# Patient Record
Sex: MALE | Race: WHITE | NOT HISPANIC OR LATINO | Employment: OTHER | URBAN - METROPOLITAN AREA
[De-identification: names, ages, dates, MRNs, and addresses within clinical notes are randomized per-mention and may not be internally consistent; named-entity substitution may affect disease eponyms.]

---

## 2019-11-08 ENCOUNTER — APPOINTMENT (OUTPATIENT)
Dept: MRI IMAGING | Facility: HOSPITAL | Age: 49
End: 2019-11-08

## 2019-11-08 ENCOUNTER — HOSPITAL ENCOUNTER (OUTPATIENT)
Facility: HOSPITAL | Age: 49
Setting detail: OBSERVATION
Discharge: HOME | End: 2019-11-10
Attending: EMERGENCY MEDICINE | Admitting: FAMILY MEDICINE

## 2019-11-08 ENCOUNTER — APPOINTMENT (OUTPATIENT)
Dept: GENERAL RADIOLOGY | Facility: HOSPITAL | Age: 49
End: 2019-11-08

## 2019-11-08 DIAGNOSIS — G35 MULTIPLE SCLEROSIS EXACERBATION (HCC): Primary | ICD-10-CM

## 2019-11-08 PROBLEM — Z72.0 TOBACCO ABUSE: Status: ACTIVE | Noted: 2019-11-08

## 2019-11-08 PROBLEM — R82.71 BACTERIURIA: Status: ACTIVE | Noted: 2019-11-08

## 2019-11-08 PROBLEM — F12.90 MARIJUANA USE: Status: ACTIVE | Noted: 2019-11-08

## 2019-11-08 LAB
ALBUMIN SERPL-MCNC: 4.1 G/DL (ref 3.5–5.2)
ALBUMIN/GLOB SERPL: 1.2 G/DL
ALP SERPL-CCNC: 50 U/L (ref 39–117)
ALT SERPL W P-5'-P-CCNC: 10 U/L (ref 1–41)
AMPHET+METHAMPHET UR QL: NEGATIVE
AMPHETAMINES UR QL: NEGATIVE
ANION GAP SERPL CALCULATED.3IONS-SCNC: 14 MMOL/L (ref 5–15)
AST SERPL-CCNC: 10 U/L (ref 1–40)
BACTERIA UR QL AUTO: ABNORMAL /HPF
BARBITURATES UR QL SCN: NEGATIVE
BASOPHILS # BLD AUTO: 0.03 10*3/MM3 (ref 0–0.2)
BASOPHILS NFR BLD AUTO: 0.3 % (ref 0–1.5)
BENZODIAZ UR QL SCN: POSITIVE
BILIRUB SERPL-MCNC: 0.6 MG/DL (ref 0.2–1.2)
BILIRUB UR QL STRIP: ABNORMAL
BUN BLD-MCNC: 10 MG/DL (ref 6–20)
BUN/CREAT SERPL: 9.2 (ref 7–25)
BUPRENORPHINE SERPL-MCNC: NEGATIVE NG/ML
CALCIUM SPEC-SCNC: 9.5 MG/DL (ref 8.6–10.5)
CANNABINOIDS SERPL QL: POSITIVE
CHLORIDE SERPL-SCNC: 100 MMOL/L (ref 98–107)
CLARITY UR: CLEAR
CO2 SERPL-SCNC: 25 MMOL/L (ref 22–29)
COCAINE UR QL: NEGATIVE
COLOR UR: ABNORMAL
CREAT BLD-MCNC: 1.09 MG/DL (ref 0.76–1.27)
DEPRECATED RDW RBC AUTO: 41.1 FL (ref 37–54)
EOSINOPHIL # BLD AUTO: 0.05 10*3/MM3 (ref 0–0.4)
EOSINOPHIL NFR BLD AUTO: 0.5 % (ref 0.3–6.2)
ERYTHROCYTE [DISTWIDTH] IN BLOOD BY AUTOMATED COUNT: 12.4 % (ref 12.3–15.4)
ETHANOL BLD-MCNC: <10 MG/DL (ref 0–10)
GFR SERPL CREATININE-BSD FRML MDRD: 71 ML/MIN/1.73
GLOBULIN UR ELPH-MCNC: 3.3 GM/DL
GLUCOSE BLD-MCNC: 143 MG/DL (ref 65–99)
GLUCOSE UR STRIP-MCNC: NEGATIVE MG/DL
HCT VFR BLD AUTO: 50.6 % (ref 37.5–51)
HGB BLD-MCNC: 16.4 G/DL (ref 13–17.7)
HGB UR QL STRIP.AUTO: NEGATIVE
HOLD SPECIMEN: NORMAL
HOLD SPECIMEN: NORMAL
HYALINE CASTS UR QL AUTO: ABNORMAL /LPF
IMM GRANULOCYTES # BLD AUTO: 0.02 10*3/MM3 (ref 0–0.05)
IMM GRANULOCYTES NFR BLD AUTO: 0.2 % (ref 0–0.5)
KETONES UR QL STRIP: ABNORMAL
LEUKOCYTE ESTERASE UR QL STRIP.AUTO: ABNORMAL
LYMPHOCYTES # BLD AUTO: 2.2 10*3/MM3 (ref 0.7–3.1)
LYMPHOCYTES NFR BLD AUTO: 24 % (ref 19.6–45.3)
MAGNESIUM SERPL-MCNC: 1.9 MG/DL (ref 1.6–2.6)
MCH RBC QN AUTO: 29.1 PG (ref 26.6–33)
MCHC RBC AUTO-ENTMCNC: 32.4 G/DL (ref 31.5–35.7)
MCV RBC AUTO: 89.7 FL (ref 79–97)
METHADONE UR QL SCN: NEGATIVE
MONOCYTES # BLD AUTO: 0.48 10*3/MM3 (ref 0.1–0.9)
MONOCYTES NFR BLD AUTO: 5.2 % (ref 5–12)
NEUTROPHILS # BLD AUTO: 6.38 10*3/MM3 (ref 1.7–7)
NEUTROPHILS NFR BLD AUTO: 69.8 % (ref 42.7–76)
NITRITE UR QL STRIP: NEGATIVE
NRBC BLD AUTO-RTO: 0 /100 WBC (ref 0–0.2)
OPIATES UR QL: POSITIVE
OXYCODONE UR QL SCN: NEGATIVE
PCP UR QL SCN: NEGATIVE
PH UR STRIP.AUTO: 5.5 [PH] (ref 5–8)
PLATELET # BLD AUTO: 267 10*3/MM3 (ref 140–450)
PMV BLD AUTO: 11.7 FL (ref 6–12)
POTASSIUM BLD-SCNC: 3.9 MMOL/L (ref 3.5–5.2)
PROPOXYPH UR QL: NEGATIVE
PROT SERPL-MCNC: 7.4 G/DL (ref 6–8.5)
PROT UR QL STRIP: ABNORMAL
RBC # BLD AUTO: 5.64 10*6/MM3 (ref 4.14–5.8)
RBC # UR: ABNORMAL /HPF
REF LAB TEST METHOD: ABNORMAL
SODIUM BLD-SCNC: 139 MMOL/L (ref 136–145)
SP GR UR STRIP: 1.03 (ref 1–1.03)
SQUAMOUS #/AREA URNS HPF: ABNORMAL /HPF
T4 FREE SERPL-MCNC: 1.58 NG/DL (ref 0.93–1.7)
TRICYCLICS UR QL SCN: NEGATIVE
TROPONIN T SERPL-MCNC: <0.01 NG/ML (ref 0–0.03)
TSH SERPL DL<=0.05 MIU/L-ACNC: 2.03 UIU/ML (ref 0.27–4.2)
UROBILINOGEN UR QL STRIP: ABNORMAL
VIT B12 BLD-MCNC: 1072 PG/ML (ref 211–946)
WBC NRBC COR # BLD: 9.16 10*3/MM3 (ref 3.4–10.8)
WBC UR QL AUTO: ABNORMAL /HPF
WHOLE BLOOD HOLD SPECIMEN: NORMAL
WHOLE BLOOD HOLD SPECIMEN: NORMAL

## 2019-11-08 PROCEDURE — 84484 ASSAY OF TROPONIN QUANT: CPT

## 2019-11-08 PROCEDURE — 93005 ELECTROCARDIOGRAM TRACING: CPT

## 2019-11-08 PROCEDURE — 99220 PR INITIAL OBSERVATION CARE/DAY 70 MINUTES: CPT | Performed by: INTERNAL MEDICINE

## 2019-11-08 PROCEDURE — 81001 URINALYSIS AUTO W/SCOPE: CPT | Performed by: EMERGENCY MEDICINE

## 2019-11-08 PROCEDURE — 82607 VITAMIN B-12: CPT | Performed by: EMERGENCY MEDICINE

## 2019-11-08 PROCEDURE — 84443 ASSAY THYROID STIM HORMONE: CPT | Performed by: EMERGENCY MEDICINE

## 2019-11-08 PROCEDURE — 84439 ASSAY OF FREE THYROXINE: CPT | Performed by: EMERGENCY MEDICINE

## 2019-11-08 PROCEDURE — 71045 X-RAY EXAM CHEST 1 VIEW: CPT

## 2019-11-08 PROCEDURE — 85025 COMPLETE CBC W/AUTO DIFF WBC: CPT

## 2019-11-08 PROCEDURE — G0378 HOSPITAL OBSERVATION PER HR: HCPCS

## 2019-11-08 PROCEDURE — 83735 ASSAY OF MAGNESIUM: CPT

## 2019-11-08 PROCEDURE — 96365 THER/PROPH/DIAG IV INF INIT: CPT

## 2019-11-08 PROCEDURE — 70551 MRI BRAIN STEM W/O DYE: CPT

## 2019-11-08 PROCEDURE — 80307 DRUG TEST PRSMV CHEM ANLYZR: CPT | Performed by: EMERGENCY MEDICINE

## 2019-11-08 PROCEDURE — 25010000003 METHYLPREDNISOLONE PER 125 MG: Performed by: EMERGENCY MEDICINE

## 2019-11-08 PROCEDURE — 93005 ELECTROCARDIOGRAM TRACING: CPT | Performed by: EMERGENCY MEDICINE

## 2019-11-08 PROCEDURE — 70552 MRI BRAIN STEM W/DYE: CPT

## 2019-11-08 PROCEDURE — 80053 COMPREHEN METABOLIC PANEL: CPT

## 2019-11-08 PROCEDURE — 99284 EMERGENCY DEPT VISIT MOD MDM: CPT

## 2019-11-08 RX ORDER — SODIUM CHLORIDE 0.9 % (FLUSH) 0.9 %
10 SYRINGE (ML) INJECTION EVERY 12 HOURS SCHEDULED
Status: DISCONTINUED | OUTPATIENT
Start: 2019-11-08 | End: 2019-11-10 | Stop reason: HOSPADM

## 2019-11-08 RX ORDER — LIDOCAINE HYDROCHLORIDE 20 MG/ML
15 SOLUTION OROPHARYNGEAL ONCE
Status: COMPLETED | OUTPATIENT
Start: 2019-11-08 | End: 2019-11-08

## 2019-11-08 RX ORDER — SODIUM CHLORIDE 0.9 % (FLUSH) 0.9 %
10 SYRINGE (ML) INJECTION AS NEEDED
Status: DISCONTINUED | OUTPATIENT
Start: 2019-11-08 | End: 2019-11-10 | Stop reason: HOSPADM

## 2019-11-08 RX ORDER — ALUMINA, MAGNESIA, AND SIMETHICONE 2400; 2400; 240 MG/30ML; MG/30ML; MG/30ML
15 SUSPENSION ORAL ONCE
Status: COMPLETED | OUTPATIENT
Start: 2019-11-08 | End: 2019-11-08

## 2019-11-08 RX ORDER — ACETAMINOPHEN 160 MG/5ML
650 SOLUTION ORAL EVERY 4 HOURS PRN
Status: DISCONTINUED | OUTPATIENT
Start: 2019-11-08 | End: 2019-11-10 | Stop reason: HOSPADM

## 2019-11-08 RX ORDER — CALCIUM CARBONATE 750 MG/1
1500 TABLET, CHEWABLE ORAL 3 TIMES DAILY PRN
Status: DISCONTINUED | OUTPATIENT
Start: 2019-11-08 | End: 2019-11-10 | Stop reason: HOSPADM

## 2019-11-08 RX ORDER — ACETAMINOPHEN 650 MG/1
650 SUPPOSITORY RECTAL EVERY 4 HOURS PRN
Status: DISCONTINUED | OUTPATIENT
Start: 2019-11-08 | End: 2019-11-10 | Stop reason: HOSPADM

## 2019-11-08 RX ORDER — ACETAMINOPHEN 325 MG/1
650 TABLET ORAL EVERY 4 HOURS PRN
Status: DISCONTINUED | OUTPATIENT
Start: 2019-11-08 | End: 2019-11-10 | Stop reason: HOSPADM

## 2019-11-08 RX ADMIN — ALUMINUM HYDROXIDE, MAGNESIUM HYDROXIDE, AND DIMETHICONE 15 ML: 400; 400; 40 SUSPENSION ORAL at 17:17

## 2019-11-08 RX ADMIN — METHYLPREDNISOLONE SODIUM SUCCINATE 1 G: 1 INJECTION, POWDER, FOR SOLUTION INTRAMUSCULAR; INTRAVENOUS at 20:15

## 2019-11-08 RX ADMIN — LIDOCAINE HYDROCHLORIDE 15 ML: 20 SOLUTION ORAL; TOPICAL at 17:17

## 2019-11-08 NOTE — ED PROVIDER NOTES
"Mavis Williamson is a 49 y.o. male who presents to the ED with c/o dizziness. The patient has had dizziness for the past month that is characteristic of off-balance and \"wobbly.\" He has difficulty ambulating as he veers from side to side and must widen his steps to compensate for this imbalance. His significant other states he has had increased hearing loss over the past month with the left sided being worse. The patient complains of intermittent pain behind his right eye and occasional slurred speech but denies visual disturbances. His significant other reports the patient has been wearing new eye glasses for the past month. He is not taking any daily medications and he has not seen a physician in a while. The patient has a family history of vertigo but he has no past diagnosis of the condition. He denies consumption of alcohol. There are no other acute complaints at this time.        History provided by:  Patient and significant other  Dizziness   Quality:  Head spinning and imbalance  Severity:  Moderate  Onset quality:  Gradual  Duration:  1 month  Timing:  Constant  Progression:  Worsening  Chronicity:  New  Relieved by:  Being still  Worsened by:  Eye movement and turning head  Ineffective treatments:  Closing eyes  Associated symptoms: hearing loss    Associated symptoms: no blood in stool, no chest pain, no nausea, no syncope, no vomiting and no weakness    Risk factors: no anemia, no heart disease, no hx of stroke, no hx of vertigo, no Meniere's disease, no multiple medications and no new medications        Review of Systems   HENT: Positive for hearing loss.    Eyes: Positive for pain (intermittent behind right eye). Negative for visual disturbance.   Cardiovascular: Negative for chest pain and syncope.   Gastrointestinal: Negative for blood in stool, nausea and vomiting.   Neurological: Positive for dizziness and speech difficulty (occasional slurred speech). Negative for syncope, facial " asymmetry and weakness.        Patient complains of difficulty ambulating, cannot walk in a straight line.   All other systems reviewed and are negative.      History reviewed. No pertinent past medical history.    No Known Allergies    History reviewed. No pertinent surgical history.    Family History   Problem Relation Age of Onset   • No Known Problems Mother        Social History     Socioeconomic History   • Marital status:      Spouse name: Not on file   • Number of children: Not on file   • Years of education: Not on file   • Highest education level: Not on file   Tobacco Use   • Smoking status: Current Some Day Smoker     Types: Cigarettes   • Smokeless tobacco: Never Used   Substance and Sexual Activity   • Alcohol use: Yes     Comment: rare   • Drug use: Yes     Types: Marijuana     Comment: socially    • Sexual activity: Defer         Objective   Physical Exam   Constitutional: He is oriented to person, place, and time. He appears well-developed and well-nourished. No distress.   HENT:   Head: Normocephalic and atraumatic.   Eyes: Conjunctivae are normal. No scleral icterus.   Neck: Normal range of motion. Neck supple.   Cardiovascular: Normal rate, regular rhythm and normal heart sounds.   No murmur heard.  Pulmonary/Chest: Effort normal and breath sounds normal. No respiratory distress.   Abdominal: Soft. There is no tenderness. There is no rebound and no guarding.   Musculoskeletal: Normal range of motion. He exhibits no edema.   Neurological: He is alert and oriented to person, place, and time. He has normal strength. He displays no tremor. No cranial nerve deficit. He exhibits normal muscle tone. He displays no seizure activity. Coordination normal.   Finger-nose normal.  No nystagmus.   Skin: Skin is warm and dry.   Psychiatric: He has a normal mood and affect. His behavior is normal.   Nursing note and vitals reviewed.      Procedures         ED Course     Intact neuro exam.  MRI shows  lesions suggestive of MS.  UDS multiple positive.  Discussed with Dr Zeng who recommended admission, high dose steroids, contrasted MRI and further workup.  Patient stable on serial rechecks.  Discussed exam findings, test results so far and concerns in detail at the bedside.  Discussed need for admission for further evaluation and treatment.                  MDM  Number of Diagnoses or Management Options  Multiple sclerosis exacerbation (CMS/HCC):      Amount and/or Complexity of Data Reviewed  Clinical lab tests: ordered and reviewed  Tests in the radiology section of CPT®: ordered and reviewed  Decide to obtain previous medical records or to obtain history from someone other than the patient: yes  Obtain history from someone other than the patient: yes  Discuss the patient with other providers: yes  Independent visualization of images, tracings, or specimens: yes        Final diagnoses:   Multiple sclerosis exacerbation (CMS/HCC)       Documentation assistance provided by olvin Lombardo.  Information recorded by the olvin was done at my direction and has been verified and validated by me.     Errol Lombardo  11/08/19 1606       Errol Lombardo  11/08/19 1809       Errol Lombardo  11/08/19 1804       Alden Montgomery MD  11/08/19 0676

## 2019-11-09 ENCOUNTER — APPOINTMENT (OUTPATIENT)
Dept: CT IMAGING | Facility: HOSPITAL | Age: 49
End: 2019-11-09

## 2019-11-09 LAB
ANION GAP SERPL CALCULATED.3IONS-SCNC: 13 MMOL/L (ref 5–15)
BASOPHILS # BLD MANUAL: 0 10*3/MM3 (ref 0–0.2)
BASOPHILS NFR BLD AUTO: 0 % (ref 0–1.5)
BUN BLD-MCNC: 10 MG/DL (ref 6–20)
BUN/CREAT SERPL: 9.8 (ref 7–25)
CALCIUM SPEC-SCNC: 9.6 MG/DL (ref 8.6–10.5)
CHLORIDE SERPL-SCNC: 98 MMOL/L (ref 98–107)
CO2 SERPL-SCNC: 26 MMOL/L (ref 22–29)
CREAT BLD-MCNC: 1.02 MG/DL (ref 0.76–1.27)
CRP SERPL-MCNC: 0.06 MG/DL (ref 0–0.5)
DEPRECATED RDW RBC AUTO: 41.3 FL (ref 37–54)
EOSINOPHIL # BLD MANUAL: 0 10*3/MM3 (ref 0–0.4)
EOSINOPHIL NFR BLD MANUAL: 0 % (ref 0.3–6.2)
ERYTHROCYTE [DISTWIDTH] IN BLOOD BY AUTOMATED COUNT: 12.4 % (ref 12.3–15.4)
ERYTHROCYTE [SEDIMENTATION RATE] IN BLOOD: 28 MM/HR (ref 0–20)
GFR SERPL CREATININE-BSD FRML MDRD: 77 ML/MIN/1.73
GLUCOSE BLD-MCNC: 151 MG/DL (ref 65–99)
HBA1C MFR BLD: 5.5 % (ref 4.8–5.6)
HCT VFR BLD AUTO: 52.8 % (ref 37.5–51)
HGB BLD-MCNC: 16.9 G/DL (ref 13–17.7)
LARGE PLATELETS: ABNORMAL
LYMPHOCYTES # BLD MANUAL: 0.76 10*3/MM3 (ref 0.7–3.1)
LYMPHOCYTES NFR BLD MANUAL: 1 % (ref 5–12)
LYMPHOCYTES NFR BLD MANUAL: 10 % (ref 19.6–45.3)
MCH RBC QN AUTO: 28.9 PG (ref 26.6–33)
MCHC RBC AUTO-ENTMCNC: 32 G/DL (ref 31.5–35.7)
MCV RBC AUTO: 90.3 FL (ref 79–97)
MONOCYTES # BLD AUTO: 0.08 10*3/MM3 (ref 0.1–0.9)
NEUTROPHILS # BLD AUTO: 6.48 10*3/MM3 (ref 1.7–7)
NEUTROPHILS NFR BLD MANUAL: 85 % (ref 42.7–76)
PLATELET # BLD AUTO: 296 10*3/MM3 (ref 140–450)
PMV BLD AUTO: 12.5 FL (ref 6–12)
POTASSIUM BLD-SCNC: 4.8 MMOL/L (ref 3.5–5.2)
RBC # BLD AUTO: 5.85 10*6/MM3 (ref 4.14–5.8)
RBC MORPH BLD: NORMAL
SMUDGE CELLS BLD QL SMEAR: ABNORMAL
SODIUM BLD-SCNC: 137 MMOL/L (ref 136–145)
VARIANT LYMPHS NFR BLD MANUAL: 4 % (ref 0–5)
WBC NRBC COR # BLD: 7.62 10*3/MM3 (ref 3.4–10.8)

## 2019-11-09 PROCEDURE — 85025 COMPLETE CBC W/AUTO DIFF WBC: CPT | Performed by: NURSE PRACTITIONER

## 2019-11-09 PROCEDURE — 0 IOPAMIDOL PER 1 ML: Performed by: FAMILY MEDICINE

## 2019-11-09 PROCEDURE — 97162 PT EVAL MOD COMPLEX 30 MIN: CPT

## 2019-11-09 PROCEDURE — 71270 CT THORAX DX C-/C+: CPT

## 2019-11-09 PROCEDURE — G0378 HOSPITAL OBSERVATION PER HR: HCPCS

## 2019-11-09 PROCEDURE — 99225 PR SBSQ OBSERVATION CARE/DAY 25 MINUTES: CPT | Performed by: FAMILY MEDICINE

## 2019-11-09 PROCEDURE — 85652 RBC SED RATE AUTOMATED: CPT | Performed by: NURSE PRACTITIONER

## 2019-11-09 PROCEDURE — 0 GADOBENATE DIMEGLUMINE 529 MG/ML SOLUTION: Performed by: INTERNAL MEDICINE

## 2019-11-09 PROCEDURE — 74178 CT ABD&PLV WO CNTR FLWD CNTR: CPT

## 2019-11-09 PROCEDURE — 86140 C-REACTIVE PROTEIN: CPT | Performed by: NURSE PRACTITIONER

## 2019-11-09 PROCEDURE — 83036 HEMOGLOBIN GLYCOSYLATED A1C: CPT | Performed by: INTERNAL MEDICINE

## 2019-11-09 PROCEDURE — A9577 INJ MULTIHANCE: HCPCS | Performed by: INTERNAL MEDICINE

## 2019-11-09 PROCEDURE — 80048 BASIC METABOLIC PNL TOTAL CA: CPT | Performed by: NURSE PRACTITIONER

## 2019-11-09 RX ORDER — FAMOTIDINE 20 MG/1
20 TABLET, FILM COATED ORAL
Status: DISCONTINUED | OUTPATIENT
Start: 2019-11-09 | End: 2019-11-10 | Stop reason: HOSPADM

## 2019-11-09 RX ORDER — IBUPROFEN 600 MG/1
600 TABLET ORAL EVERY 6 HOURS PRN
Status: DISCONTINUED | OUTPATIENT
Start: 2019-11-09 | End: 2019-11-10 | Stop reason: HOSPADM

## 2019-11-09 RX ORDER — HYDROXYZINE HYDROCHLORIDE 25 MG/1
25 TABLET, FILM COATED ORAL 3 TIMES DAILY PRN
Status: DISCONTINUED | OUTPATIENT
Start: 2019-11-09 | End: 2019-11-10 | Stop reason: HOSPADM

## 2019-11-09 RX ADMIN — SODIUM CHLORIDE, PRESERVATIVE FREE 10 ML: 5 INJECTION INTRAVENOUS at 08:47

## 2019-11-09 RX ADMIN — Medication 400 MG: at 12:15

## 2019-11-09 RX ADMIN — METOPROLOL TARTRATE 12.5 MG: 25 TABLET, FILM COATED ORAL at 19:28

## 2019-11-09 RX ADMIN — Medication 1500 MG: at 08:47

## 2019-11-09 RX ADMIN — FAMOTIDINE 20 MG: 20 TABLET, FILM COATED ORAL at 11:28

## 2019-11-09 RX ADMIN — HYDROXYZINE HYDROCHLORIDE 25 MG: 25 TABLET, FILM COATED ORAL at 22:44

## 2019-11-09 RX ADMIN — GADOBENATE DIMEGLUMINE 19 ML: 529 INJECTION, SOLUTION INTRAVENOUS at 00:45

## 2019-11-09 RX ADMIN — IBUPROFEN 600 MG: 600 TABLET, FILM COATED ORAL at 12:15

## 2019-11-09 RX ADMIN — IOPAMIDOL 100 ML: 755 INJECTION, SOLUTION INTRAVENOUS at 15:51

## 2019-11-09 RX ADMIN — FAMOTIDINE 20 MG: 20 TABLET, FILM COATED ORAL at 17:15

## 2019-11-09 NOTE — PROGRESS NOTES
"    [facility]  PROGRESS NOTE    Patient Name: Man Williamson  : 1970  MRN: [id number]    Date of Admission: 2019  Primary Care Physician: Provider, No Known    Subjective   Subjective     CC:  Dizziness    HPI:  Patient is a 49-year-old who was admitted after presenting to the emergency department complaining of several day history of dizziness.  He also reports some headaches.  His MRI was positive for contrast-enhancing white matter lesions concerning for demyelinating disease.  It is thought that he may have multiple sclerosis.  Neurology consult has been requested.  Patient denies other complaints or symptoms.  He states he has not been falling down or had changes in his vision.  We discussed proceeding with a work-up including a lumbar puncture, at this time he is declining to have a lumbar puncture stating he does not want to risk being paralyzed.  I reassured him that risk of complication although present is very low with this procedure and I encouraged him to discuss this with neurology.  He is tolerating p.o.  He denies fever.  He appears to be unaccepting of the potential that he could have MS.  He states \"for 50 or 100 years no one in my whole family has had MS.\"    Review of Systems  General: No fever, chills, fatigue  ENT: No sore throat, trouble swallowing or changes in vision  Respiratory: No shortness of breath, cough, wheezing or fast breathing  Cardiovascular: No chest pain, palpitations, dyspnea with exertion  Gastrointestinal: No nausea vomiting abdominal pain  Musculoskeletal: No difficulty walking, no weakness  Vascular: No cyanosis or clubbing  Lymphatic: No peripheral edema, no lymphadenopathy  Neurologic: Positive headache, denies confusion, positive dizziness  Psychiatric: No changes in mood.  No depression or anxiety.     Objective   Objective     Vital Signs:   Temp:  [97.5 °F (36.4 °C)-99.5 °F (37.5 °C)] 97.7 °F (36.5 °C)  Heart Rate: "  [] 109  Resp:  [16-18] 18  BP: (112-131)/(68-90) 122/70        Physical Exam:  Constitutional: No acute distress, awake, alert, nontoxic, normal body habitus  Eyes: Pupils equal, sclerae anicteric, no conjunctival injection  HENT: NCAT, mucous membranes moist  Neck: Supple, no thyromegaly, no lymphadenopathy  Respiratory: Clear to auscultation bilaterally, good effort, nonlabored respirations   Cardiovascular: RRR  Gastrointestinal: Positive bowel sounds, soft, nontender, nondistended  Musculoskeletal: No peripheral edema, normal muscle tone for age  Psychiatric: At times tearful, cooperative  Neurologic: Oriented x 3, movements symmetric BUE and BLE, Cranial Nerves grossly intact to confrontation, speech clear and fluent  Skin: No rashes, no jaundice, no petechiae, no mottling  Results Reviewed:    Results from last 7 days   Lab Units 11/08/19  1347   WBC 10*3/mm3 9.16   HEMOGLOBIN g/dL 16.4   HEMATOCRIT % 50.6   PLATELETS 10*3/mm3 267     Results from last 7 days   Lab Units 11/09/19  0631 11/08/19  1346   SODIUM mmol/L 137 139   POTASSIUM mmol/L 4.8 3.9   CHLORIDE mmol/L 98 100   CO2 mmol/L 26.0 25.0   BUN mg/dL 10 10   CREATININE mg/dL 1.02 1.09   GLUCOSE mg/dL 151* 143*   CALCIUM mg/dL 9.6 9.5   ALT (SGPT) U/L  --  10   AST (SGOT) U/L  --  10   TROPONIN T ng/mL  --  <0.010     Estimated Creatinine Clearance: 105.3 mL/min (by C-G formula based on SCr of 1.02 mg/dL).    Microbiology Results Abnormal     None          Imaging Results (Last 24 Hours)     Procedure Component Value Units Date/Time    MRI Brain With Contrast [[id number]] Collected:  11/09/19 0751     Updated:  11/09/19 0754    Narrative:       EXAMINATION: MRI BRAIN W CONTRAST-     INDICATION: dizziness, ataxia, ? MS; G35-Multiple sclerosis pain behind  the right eye, dizziness, ataxia     TECHNIQUE: Routine multiple imaging is obtained of the brain following  the ministration gadolinium contrast.     COMPARISON: NONE     FINDINGS: There are  multiple too numerous to count contrast enhancing  lesions scattered throughout the parenchyma. Findings most concerning  for metastatic process. There is less likelyhood of a active  demyelinating white matter process given the extensive areas of  enhancement and involvement. There is minimal surrounding edema.  Continued follow-up is recommended as indicated with evaluation for  primary source recommended.          Impression:       Multiple too numerous to count areas of contrast enhancement  correlating to the areas of increased signal on the noncontrasted  examination. Findings concerning for metastatic process given the extent  of enhancement. Findings less favor matter process due to the extensive  enhancement pattern.           MRI Brain Without Contrast [[id number]] Collected:  11/08/19 1628     Updated:  11/08/19 1653    Narrative:       EXAMINATION: MRI BRAIN WO CONTRAST-     INDICATION: ataxia, L ear hearing loss, R frontal headaches for 1 month  right I pain and loss of sight     TECHNIQUE: Routine multiple imaging is obtained of the brain without the  ministration gadolinium contrast.     COMPARISON: NONE     FINDINGS: There is abnormal signal diffusion-weighted imaging within the  right parietal lobe suggesting possible active MS plaque. There is no  evidence of restricted diffusion at this time to suggest evidence of  tumefactive MS. There is areas of abnormal signal intensity seen  scattered throughout the peritoneal or and subcortical white matter  suggesting either chronic small vessel ischemic change or possibly  developing white matter process. There is some central low signal seen  within several of the lesions within the right periventricular white  matter concerning for possible to developing white matter process such  as multiple sclerosis. Contrast enhancement imaging is recommended for  further evaluation to exclude possible active demyelinating white matter  disease. There is abnormal  signal is well seen within the cerebellum.  Abnormal signal seen within the brainstem. Visualized paranasal sinuses  are clear. The mastoid air cells are patent. Pituitary and sellar  unremarkable. Craniovertebral junction is preserved.          Impression:       Scattered areas of abnormal signal intensity seen throughout  the periventricular and subcortical white matter as well as within the  cerebellum and brainstem. There is some edema identified as well as  abnormal signal on diffusion imaging concerning for possible active  demyelinating white matter disease.. Enhancement is recommended for  further evaluation of active plaque in lesions.. There is no evidence of  restricted diffusion at this time to suggest evidence of tumefactive MS.  Continued follow-up is recommended.           XR Chest 1 View [[id number]] Collected:  11/08/19 1501     Updated:  11/08/19 1502    Narrative:          EXAMINATION: XR CHEST 1 VW-      INDICATION: Weak/Dizzy/AMS triage protocol      COMPARISON: NONE     FINDINGS: Portable chest reveals cardiac and mediastinal silhouettes  within normal limits. The lung fields are grossly clear. No focal right  opacification present. A pleural effusion or pneumothorax. Bony  structures are unremarkable. Pulmonary vascularity is within normal  limits.           Impression:       No acute cardiopulmonary disease                     I have reviewed the medications:  Scheduled Meds:  enoxaparin 40 mg Subcutaneous Q24H   sodium chloride 10 mL Intravenous Q12H     Continuous Infusions:   PRN Meds:.•  acetaminophen **OR** acetaminophen **OR** acetaminophen  •  calcium carbonate EX  •  sodium chloride  •  sodium chloride      Assessment/Plan   Assessment / Plan     Active Hospital Problems    Diagnosis  POA   • **Multiple sclerosis exacerbation (CMS/HCC) [G35]  Yes   • Marijuana use [F12.90]  Yes   • Tobacco abuse [Z72.0]  Yes   • Bacteriuria [R82.71]  Yes      Resolved Hospital Problems   No resolved  problems to display.        Brief Hospital Course to date:  Man Williamson is a 49 y.o. male who presented to the emergency department with complaints of dizziness and headache.  He was found to have contrast-enhancing brain lesions on MRI concerning for demyelinating disease such as multiple sclerosis.    Demyelinating lesions of the brain concerning for multiple sclerosis  -Neurology consult pending  -At the present patient is declining lumbar puncture  -Continue steroids    Headache  -Tylenol PRN    Dizziness  -Likely secondary to demyelinating process    Bacteriuria  -Asymptomatic, holding antibiotics     Gastroesophageal reflux disease   -Tums and Pepcid as needed    Tobacco abuse    Marijuana use      DVT Prophylaxis: Mechanical secondary to potential need for lumbar puncture      CODE STATUS:   Code Status and Medical Interventions:   Ordered at: 11/08/19 2046     Level Of Support Discussed With:    Patient     Code Status:    CPR     Medical Interventions (Level of Support Prior to Arrest):    Full         Electronically signed by Mirna Ruelas MD, 11/09/19, 9:21 AM.

## 2019-11-09 NOTE — NURSING NOTE
"Pt did ambulate with physical therapy today, but denies wanting to continue despite reported weakness and ataxia as noted from the therapist. CT of abd/pelvis completed. Pt still doesn't want lumbar puncture, but is focused on \"getting out of here.\" Vital signs stable. Only reports having chronic back pain and \"twinges\" behind right eye.  "

## 2019-11-09 NOTE — PLAN OF CARE
Problem: Patient Care Overview  Goal: Plan of Care Review  Outcome: Ongoing (interventions implemented as appropriate)    Goal: Discharge Needs Assessment  Outcome: Ongoing (interventions implemented as appropriate)    Goal: Interprofessional Rounds/Family Conf  Outcome: Ongoing (interventions implemented as appropriate)      Problem: Pain, Chronic (Adult)  Goal: Identify Related Risk Factors and Signs and Symptoms  Outcome: Ongoing (interventions implemented as appropriate)    Goal: Acceptable Pain/Comfort Level and Functional Ability  Outcome: Ongoing (interventions implemented as appropriate)      Problem: Fall Risk (Adult)  Goal: Identify Related Risk Factors and Signs and Symptoms  Outcome: Ongoing (interventions implemented as appropriate)    Goal: Absence of Fall  Outcome: Outcome(s) achieved Date Met: 11/09/19

## 2019-11-09 NOTE — PROGRESS NOTES
"Adult Nutrition  Assessment/PES    Patient Name:  Man Williamson  YOB: 1970  MRN: [id number]  Admit Date:  11/8/2019    Assessment Date:  11/9/2019    Comments:      Reason for Assessment     Row Name 11/09/19 1121          Reason for Assessment    Reason For Assessment  identified at risk by screening criteria 30\". PT REPORTS HIS APPETITE & INTAKE OF FOOD MAY HAVE BEEN DOWN RECENTLY BUT NOT < 75% OF USUAL. HE REPORTS MAY HAVE LOST WEIGHT BUT HIS WEIGHT FLUCTUATES NORMALLY. HE DID REPORTS SPECIFIC FOOD PREFS AS HE IS \"VERY PICKY\" WITH FOOD. THESE PREFS TAKEN &      Diagnosis  -- WILL BE SENT TO FOOD & NUTRITION SERVICES. PT DOES NOT MEET CRITERIA FOR FURTHER SCREENING & WILL SEE PER PROTOCOL.     Identified At Risk by Screening Criteria  unintentional loss of 10 lbs or more in the past 2 mos;MST SCORE 2+;reduced oral intake over the last month                             Problem/Interventions:                    Electronically signed by:  Vielka Jernigan RD  11/09/19 11:24 AM  "

## 2019-11-09 NOTE — H&P
"[facility] Medicine Services  HISTORY AND PHYSICAL    Patient Name: Man Williamson  : 1970  MRN: [id number]  Primary Care Physician: Provider, No Known    Subjective   Subjective     Chief Complaint:  Dizziness, \"off balance\"     HPI:  Man Williamson is a 49 y.o. male with a past medical history significant for tobacco use and marijuana use presents to the ED with complaints of dizziness.  Patient reports over the past month dizziness(worse at night and in am) with hearing loss (worse on left) along with feeling as if \"something is behind my right eye.\"  Patient reports having to have a \"strong prescription\" for his glasses.  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 In addition he notes difficulty with his gait He denies any nausea, vomiting, diarrhea, abdominal pain,  Chest pain, shortness of air, dysuria, hematuria, tremor or changes in speech.  MRI wo contrast obtained tonight is concerning for scattered areas of abnormal signal intensity seen throughout the periventricular and subcortical white matter as well as within the cerebellum and brainstem.  Some edema is identified as well as abnormal signal on diffusion imagine concerning for possible active demyelinating white matter disease. Patient will be admitted to [facility] under the care of the Hospitalist for further evaluation and treatment.     Review of Systems   Constitutional: Positive for activity change. Negative for appetite change, chills, diaphoresis, fatigue, fever and unexpected weight change.   HENT: Positive for hearing loss.    Eyes: Negative for photophobia.   Respiratory: Negative for cough and shortness of breath.    Cardiovascular: Negative for chest pain, palpitations and leg swelling.   Gastrointestinal: Negative for abdominal distention, abdominal pain, blood in stool, constipation, diarrhea, nausea and " vomiting.   Genitourinary: Negative for difficulty urinating, dysuria, flank pain, frequency and hematuria.   Musculoskeletal: Positive for gait problem. Negative for neck pain and neck stiffness.   Skin: Negative.    Neurological: Positive for dizziness. Negative for seizures, syncope, facial asymmetry, speech difficulty, weakness, light-headedness, numbness and headaches.   Psychiatric/Behavioral: Negative.         Otherwise 10-system ROS reviewed and is negative except as mentioned in the HPI.    Personal History     History reviewed. No pertinent past medical history.    History reviewed. No pertinent surgical history.    Family History: family history includes No Known Problems in his mother.     Social History:  reports that he has been smoking cigarettes.  He has never used smokeless tobacco. He reports that he drinks alcohol. He reports that he uses drugs. Drug: Marijuana.    Medications:  No medications prior to admission.       No Known Allergies    Objective   Objective     Vital Signs:   Temp:  [98.6 °F (37 °C)] 98.6 °F (37 °C)  Heart Rate:  [] 93  Resp:  [16] 16  BP: (115-129)/(78-90) 129/90        Physical Exam   Constitutional: He is oriented to person, place, and time. He appears well-developed and well-nourished. No distress.   Alert and oriented x4, wife at bedside.     HENT:   Head: Normocephalic and atraumatic.   Eyes: Conjunctivae are normal. Pupils are equal, round, and reactive to light. Right eye exhibits no discharge. Left eye exhibits no discharge. No scleral icterus.   Nystagmus noted    Neck: Normal range of motion. Neck supple. No JVD present. No tracheal deviation present. No thyromegaly present.   Cardiovascular: Normal rate, regular rhythm, normal heart sounds and intact distal pulses. Exam reveals no gallop and no friction rub.   No murmur heard.  Pulmonary/Chest: Effort normal and breath sounds normal. No stridor. No respiratory distress. He has no wheezes. He has no rales. He  exhibits no tenderness.   Abdominal: Soft. Bowel sounds are normal. He exhibits no distension and no mass. There is no tenderness. There is no rebound and no guarding. No hernia.   Musculoskeletal: Normal range of motion. He exhibits no edema, tenderness or deformity.   Lymphadenopathy:     He has no cervical adenopathy.   Neurological: He is alert and oriented to person, place, and time.    equal, nystagmus noted, no clonus, sensation intact throughout. Speech clear.    Skin: Skin is warm and dry. No rash noted. He is not diaphoretic. No erythema. No pallor.   Psychiatric: He has a normal mood and affect. His behavior is normal. Judgment and thought content normal.   Nursing note and vitals reviewed.       Results Reviewed:  I have personally reviewed current lab, radiology, and data and agree.    Results from last 7 days   Lab Units 11/08/19  1347   WBC 10*3/mm3 9.16   HEMOGLOBIN g/dL 16.4   HEMATOCRIT % 50.6   PLATELETS 10*3/mm3 267     Results from last 7 days   Lab Units 11/08/19  1346   SODIUM mmol/L 139   POTASSIUM mmol/L 3.9   CHLORIDE mmol/L 100   CO2 mmol/L 25.0   BUN mg/dL 10   CREATININE mg/dL 1.09   GLUCOSE mg/dL 143*   CALCIUM mg/dL 9.5   ALT (SGPT) U/L 10   AST (SGOT) U/L 10     No results found for: BNP  No results found for: PHART, PCO2  Imaging Results (Last 24 Hours)     Procedure Component Value Units Date/Time    MRI Brain Without Contrast [[id number]] Collected:  11/08/19 1628     Updated:  11/08/19 1653    Narrative:       EXAMINATION: MRI BRAIN WO CONTRAST-     INDICATION: ataxia, L ear hearing loss, R frontal headaches for 1 month  right I pain and loss of sight     TECHNIQUE: Routine multiple imaging is obtained of the brain without the  ministration gadolinium contrast.     COMPARISON: NONE     FINDINGS: There is abnormal signal diffusion-weighted imaging within the  right parietal lobe suggesting possible active MS plaque. There is no  evidence of restricted diffusion at this time to  suggest evidence of  tumefactive MS. There is areas of abnormal signal intensity seen  scattered throughout the peritoneal or and subcortical white matter  suggesting either chronic small vessel ischemic change or possibly  developing white matter process. There is some central low signal seen  within several of the lesions within the right periventricular white  matter concerning for possible to developing white matter process such  as multiple sclerosis. Contrast enhancement imaging is recommended for  further evaluation to exclude possible active demyelinating white matter  disease. There is abnormal signal is well seen within the cerebellum.  Abnormal signal seen within the brainstem. Visualized paranasal sinuses  are clear. The mastoid air cells are patent. Pituitary and sellar  unremarkable. Craniovertebral junction is preserved.          Impression:       Scattered areas of abnormal signal intensity seen throughout  the periventricular and subcortical white matter as well as within the  cerebellum and brainstem. There is some edema identified as well as  abnormal signal on diffusion imaging concerning for possible active  demyelinating white matter disease.. Enhancement is recommended for  further evaluation of active plaque in lesions.. There is no evidence of  restricted diffusion at this time to suggest evidence of tumefactive MS.  Continued follow-up is recommended.           XR Chest 1 View [[id number]] Collected:  11/08/19 1501     Updated:  11/08/19 1502    Narrative:          EXAMINATION: XR CHEST 1 VW-      INDICATION: Weak/Dizzy/AMS triage protocol      COMPARISON: NONE     FINDINGS: Portable chest reveals cardiac and mediastinal silhouettes  within normal limits. The lung fields are grossly clear. No focal right  opacification present. A pleural effusion or pneumothorax. Bony  structures are unremarkable. Pulmonary vascularity is within normal  limits.           Impression:       No acute  "cardiopulmonary disease                   Assessment/Plan   Assessment / Plan     Active Hospital Problems    Diagnosis   • **Multiple sclerosis exacerbation (CMS/[facility])   • Marijuana use   • Tobacco abuse   • Bacteriuria         Assessment & Plan:  49 year old male presents to the ED with dizziness, ataxia, vision changes and hearing loss that has worsened over the past month.     1) Multiple sclerosis exacerbation  -new diagnosis  -MRI of brain without contrast as mentioned above  -case was discussed with neurology whom recommends MRI with contrast along with 1 gram of solumedrol.    -consult neurology in am  -fall precautions  -up with assistance     2) Bacteriuria  -UA: 2+ bacteria, negative nitrites, trace leuks  -hold on abx for now, obtain urine cultures    3) Marijuana use  -admits to smoking marijuana \"occasionally\"   -in addition he reports taking a xanax and lortab yesterday due to \"needing sleep\" but reports he does not \"take regularly\"   -counseling provided    4) Tobacco abuse  -reports only uses \"sometimes\"  -Tobacco Cessation Counselin minutes of tobacco cessation counseling provided, including but not limited to, risks of ongoing tobacco use, pertinence to current or future health status and availability/examples of cessation resources.  Patient expresses desire to quit.  -refuses nicotine patch           DVT prophylaxis: scds, lovenox     CODE STATUS: full code    There are no questions and answers to display.       Admission Status:  I believe this patient meets INPATIENT status due to IV stress dose steroids and Neurology evaluation.  I feel patient’s risk for adverse outcomes and need for care warrant INPATIENT evaluation and I predict the patient’s care encounter to likely last beyond 2 midnights.    MIKAYLA Crowell   19   8:18 PM        Brief Attending Admission Attestation     I have seen and examined the patient, performing an independent face-to-face diagnostic evaluation " with plan of care reviewed and developed with the advanced practice clinician (APC).      Brief Summary Statement:   Man Williamson is a 49 y.o. male with history of tobacco abuse presents after one month of dizziness accompanied by periodic numbness, eye pain and hearing loss. MRI brain obtained that showed white matter changes concerning for demyelinating process. Patient started on steroids and admitted to the hospitalist service.    Remainder of detailed HPI is as noted above and has been reviewed and/or edited by me for completeness.      Attending Physical Exam:  Constitutional -no acute distress, non toxic, in bed  HEENT-NCAT, mucous membranes moist  CV-RRR, S1 S2 normal, no m/r/g  Resp-CTAB, no wheezes, rhonchi or rales  Abd-soft, non-tender, non-distended, normo active bowel sounds  Ext-No lower extremity cyanosis, clubbing or edema bilaterally  Neuro-alert, speech clear, moves all extremities, no drift of UE.  Psych-normal affect   Skin- No rash on exposed UE or LE bilaterally      enoxaparin 40 mg Subcutaneous Q24H   sodium chloride 10 mL Intravenous Q12H         Brief Assessment/Plan :    Possible Demyelinating Disease  - MRI brain w contrast this evening personally reviewed, revealing multiple bilateral enhancing lesions to my eye. Mr Williamson has already received 1 gram of solumedrol this evening.  - will continue supportive care, formal Neurology consultation in am  - PT consult am    Tobacco abuse  - nicotine replacement, counseled cessation, CXR with no infiltrate.    Hyperglycemia  - check a1c am      See above for further detailed assessment and plan developed with APC which I have reviewed and/or edited for completeness.      Electronically signed by Jeffrey Matamoros MD, 11/09/19, 12:55 AM.

## 2019-11-09 NOTE — THERAPY DISCHARGE NOTE
Patient Name: Man Williamson  : 1970    MRN: [id number]                              Today's Date: 2019       Admit Date: 2019    Visit Dx:     ICD-10-CM ICD-9-CM   1. Multiple sclerosis exacerbation (CMS/HCC) G35 340     Patient Active Problem List   Diagnosis   • Multiple sclerosis exacerbation (CMS/HCC)   • Marijuana use   • Tobacco abuse   • Bacteriuria     History reviewed. No pertinent past medical history.  History reviewed. No pertinent surgical history.  General Information     Row Name 19 1440          PT Evaluation Time/Intention    Document Type  discharge evaluation/summary  -SR     Mode of Treatment  physical therapy  -SR     Row Name 19 1440          General Information    Patient Profile Reviewed?  yes  -SR     Prior Level of Function  independent:;all household mobility;community mobility;ADL's  -SR     Existing Precautions/Restrictions  fall  -SR     Barriers to Rehab  uncooperative;ineffective coping  -SR     Row Name 19 1440          Relationship/Environment    Lives With  spouse  -SR     Row Name 19 1440          Resource/Environmental Concerns    Current Living Arrangements  home/apartment/condo  -SR     Row Name 19 1440          Home Main Entrance    Number of Stairs, Main Entrance  four  -SR     Row Name 19 1440          Stairs Within Home, Primary    Stairs, Within Home, Primary  stairs into basement, but he does not need to utilize basement  -SR     Number of Stairs, Within Home, Primary  none  -SR     Row Name 19 1440          Cognitive Assessment/Intervention- PT/OT    Orientation Status (Cognition)  oriented x 3  -SR     Row Name 19 1440          Safety Issues, Functional Mobility    Safety Issues Affecting Function (Mobility)  judgment;insight into deficits/self awareness;safety precaution awareness;awareness of need for assistance;impulsivity  -SR     Impairments Affecting Function (Mobility)   balance;coordination;endurance/activity tolerance;strength  -SR       User Key  (r) = Recorded By, (t) = Taken By, (c) = Cosigned By    Initials Name Provider Type    SR Adri Gaming, PT Physical Therapist        Mobility     Row Name 11/09/19 1441          Bed Mobility Assessment/Treatment    Bed Mobility Assessment/Treatment  bed mobility (all) activities  -SR     [place] Level (Bed Mobility)  independent  -SR     Row Name 11/09/19 1441          Sit-Stand Transfer    Sit-Stand [place] (Transfers)  independent  -SR     Row Name 11/09/19 1441          Gait/Stairs Assessment/Training    Gait/Stairs Assessment/Training  gait/ambulation independence  -SR     [place] Level (Gait)  contact guard  -SR     Distance in Feet (Gait)  200  -SR     Deviations/Abnormal Patterns (Gait)  ataxic  -SR     Comment (Gait/Stairs)  pt demo ataxia throughout, however pt denies this and states that he is weak because he has been in bed x 3 days  -SR       User Key  (r) = Recorded By, (t) = Taken By, (c) = Cosigned By    Initials Name Provider Type    SR Adri Gaming, PT Physical Therapist        Obj/Interventions     Row Name 11/09/19 1443          General ROM    GENERAL ROM COMMENTS  BLE AROM WNL  -SR     Row Name 11/09/19 1443          MMT (Manual Muscle Testing)    General MMT Comments  BLE equally weak with MMT, grossly 3+/5, hips weaker than knees (pt denies weakness)  -SR     Row Name 11/09/19 1443          Static Sitting Balance    Level of [place] (Unsupported Sitting, Static Balance)  independent  -SR     Sitting Position (Unsupported Sitting, Static Balance)  sitting on edge of bed  -SR     Time Able to Maintain Position (Unsupported Sitting, Static Balance)  more than 5 minutes  -SR     Row Name 11/09/19 1443          Sensory Assessment/Intervention    Sensory General Assessment  no sensation deficits identified  -SR       User Key  (r) = Recorded By, (t) = Taken By, (c) = Cosigned By    Initials Name  Provider Type    SR Adri Gaming, PT Physical Therapist        Goals/Plan    No documentation.       Clinical Impression     Row Name 11/09/19 1445          Pain Assessment    Additional Documentation  Pain Scale: Numbers Pre/Post-Treatment (Group)  -SR     Row Name 11/09/19 1445          Pain Scale: Numbers Pre/Post-Treatment    Pain Scale: Numbers, Pretreatment  0/10 - no pain  -SR     Pain Scale: Numbers, Post-Treatment  0/10 - no pain  -SR     Row Name 11/09/19 1445          Plan of Care Review    Plan of Care Reviewed With  patient;spouse  -SR     Row Name 11/09/19 1445          Physical Therapy Clinical Impression    Patient/Family Goals Statement (PT Clinical Impression)  to go back home  -SR     Criteria for Skilled Interventions Met (PT Clinical Impression)  patient/family refuse skilled intervention at this time  -SR     Row Name 11/09/19 1445          Vital Signs    Pretreatment Heart Rate (beats/min)  109  -SR     Intratreatment Heart Rate (beats/min)  148  -SR     Posttreatment Heart Rate (beats/min)  115  -SR     Row Name 11/09/19 1445          Positioning and Restraints    Pre-Treatment Position  in bed  -SR     Post Treatment Position  bed  -SR     In Bed  notified nsg;encouraged to call for assist;call light within reach;with family/caregiver;sitting EOB pt declines exit alarm, states that he has been getting up to use the bathroom without help  -SR       User Key  (r) = Recorded By, (t) = Taken By, (c) = Cosigned By    Initials Name Provider Type    SR Adri Gaming, PT Physical Therapist        Outcome Measures     Row Name 11/09/19 1451          How much help from another person do you currently need...    Turning from your back to your side while in flat bed without using bedrails?  4  -SR     Moving from lying on back to sitting on the side of a flat bed without bedrails?  4  -SR     Moving to and from a bed to a chair (including a wheelchair)?  3  -SR     Standing up from a chair using  your arms (e.g., wheelchair, bedside chair)?  4  -SR     Climbing 3-5 steps with a railing?  3  -SR     To walk in hospital room?  3  -SR     AM-PAC 6 Clicks Score (PT)  21  -SR     Row Name 11/09/19 1451          Functional Assessment    Outcome Measure Options  AM-PAC 6 Clicks Basic Mobility (PT)  -SR       User Key  (r) = Recorded By, (t) = Taken By, (c) = Cosigned By    Initials Name Provider Type    SR Adri Gaming, PT Physical Therapist        Physical Therapy Education     Title: PT OT SLP Therapies (In Progress)     Topic: Physical Therapy (In Progress)     Point: Mobility training (In Progress)     Learning Progress Summary           Patient Nonacceptance, E,TB, NL,RT by SR at 11/9/2019  2:47 PM   Significant Other Nonacceptance, E,TB, NL,RT by SR at 11/9/2019  2:47 PM                   Point: Home exercise program (In Progress)     Learning Progress Summary           Patient Nonacceptance, E,TB, NL,RT by SR at 11/9/2019  2:47 PM   Significant Other Nonacceptance, E,TB, NL,RT by SR at 11/9/2019  2:47 PM                   Point: Body mechanics (In Progress)     Learning Progress Summary           Patient Nonacceptance, E,TB, NL,RT by SR at 11/9/2019  2:47 PM   Significant Other Nonacceptance, E,TB, NL,RT by SR at 11/9/2019  2:47 PM                   Point: Precautions (In Progress)     Learning Progress Summary           Patient Nonacceptance, E,TB, NL,RT by SR at 11/9/2019  2:47 PM   Significant Other Nonacceptance, E,TB, NL,RT by SR at 11/9/2019  2:47 PM                               User Key     Initials Effective Dates Name Provider Type Discipline    SR 06/19/15 -  Adri Gaming, PT Physical Therapist PT              PT Recommendation and Plan     Outcome Summary/Treatment Plan (PT)  Anticipated Discharge Disposition (PT): home with assist  Plan of Care Reviewed With: patient, spouse  Progress: no change  Outcome Summary: PT initial eval completed. Pt demo generalized weakness, especially BLE with  MMT (grossly 3+/5), however pt denies weakness. Ataxia also noted with gait training x 200ft with CGA and no AD, pt denies ataxia stating that he is weak from being in bed. Pt denies skilled IPPT at this time, PT encourages pt to request IPPT should he become more weak and unsteady. RN notified. Recommend home with A from spouse.      Time Calculation:   PT Charges     Row Name 11/09/19 1452             Time Calculation    Start Time  1425  -SR      PT Received On  11/09/19  -SR      PT Goal Re-Cert Due Date  11/19/19  -         Time Calculation- PT    Total Timed Code Minutes- PT  0 minute(s)  -SR        User Key  (r) = Recorded By, (t) = Taken By, (c) = Cosigned By    Initials Name Provider Type    SR Adri Gaming, PT Physical Therapist        Therapy Charges for Today     Code Description Service Date Service Provider Modifiers Qty    06249109759 HC PT EVAL MOD COMPLEXITY 3 11/9/2019 Adri Gaming, PT GP 1          PT G-Codes  Outcome Measure Options: AM-PAC 6 Clicks Basic Mobility (PT)  AM-PAC 6 Clicks Score (PT): 21    PT Discharge Summary  Anticipated Discharge Disposition (PT): home with assist    Adri Gaming, PT  11/9/2019

## 2019-11-09 NOTE — PLAN OF CARE
Problem: Patient Care Overview  Goal: Plan of Care Review  Outcome: Ongoing (interventions implemented as appropriate)   11/09/19 4087   Coping/Psychosocial   Plan of Care Reviewed With patient;spouse   Plan of Care Review   Progress no change   OTHER   Outcome Summary PT initial eval completed. Pt demo generalized weakness, especially BLE with MMT (grossly 3+/5), however pt denies weakness. Pt also denies numbness or vision changes. Ataxia also noted with gait training x 200ft with CGA and no AD, pt denies ataxia stating that he is weak from being in bed. Pt denies skilled IPPT at this time, PT encourages pt to request IPPT should he become more weak and unsteady. RN notified. Recommend home with A from spouse.

## 2019-11-09 NOTE — CONSULTS
NEUROLOGY CONSULTATION    Man Williamson    Consulting Physician: Dr. Montgomery    Chief Complaint/Reason for Consultation: dizziness/balance problems, headaches    HPI:   Subjective     Man Williamson is a 49 y.o. M who was admitted after presenting to the emergency department complaining of several day history of balance problems and headaches.  His MRI was noted to be abnormal.  Patient denies other complaints or symptoms.  His wife reports he has been unsteady and falling for about a month.  He denies any recent illnesses, fevers, chills, nausea, vomiting, weight loss, focal weakness, international travel, tick bites, or other issues.  He has not seen a doctor in 20 years. 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  He does not smoke cigarettes but he does chew tobacco.  UDS positive for THC, opiates, and benzos. He does not want to proceed with lumbar puncture currently. He received one dose of Solumedrol last night.     Patient Active Problem List    Diagnosis   • *Multiple sclerosis exacerbation (CMS/[facility]) [G35]   • Marijuana use [F12.90]   • Tobacco abuse [Z72.0]   • Bacteriuria [R82.71]     History reviewed. No pertinent surgical history.  Family History   Problem Relation Age of Onset   • No Known Problems Mother      Social History     Socioeconomic History   • Marital status:      Spouse name: Not on file   • Number of children: Not on file   • Years of education: Not on file   • Highest education level: Not on file   Tobacco Use   • Smoking status: Current Some Day Smoker     Types: Cigarettes   • Smokeless tobacco: Never Used   Substance and Sexual Activity   • Alcohol use: Yes     Comment: rare   • Drug use: Yes     Types: Marijuana     Comment: socially    • Sexual activity: Defer        Review of Systems  Review of Systems as per HPI and PMHx, AON    Objective     Vital signs in last 24 hours:  Temp:  [97.5 °F (36.4 °C)-99.5 °F (37.5 °C)] 97.6 °F (36.4 °C)  Heart Rate:   [] 110  Resp:  [16-18] 18  BP: (112-131)/(68-90) 130/89    General: NAD  HEENT: NCAT  Neuro: awake, alert and oriented x3. Language fluent.  No dysarthria. CN intact. DUMAS no drift, FTN intact b/l. SILT. No extinction to DSS.      Current Facility-Administered Medications:   •  acetaminophen (TYLENOL) tablet 650 mg, 650 mg, Oral, Q4H PRN **OR** acetaminophen (TYLENOL) 160 MG/5ML solution 650 mg, 650 mg, Oral, Q4H PRN **OR** acetaminophen (TYLENOL) suppository 650 mg, 650 mg, Rectal, Q4H PRN, Jewell, Nelia, APRN  •  calcium carbonate EX (TUMS EX) chewable tablet 1,500 mg, 1,500 mg, Oral, TID PRN, Jewell, Nelia, APRN, 1,500 mg at 11/09/19 0847  •  famotidine (PEPCID) tablet 20 mg, 20 mg, Oral, BID AC, Mirna Ruelas MD, 20 mg at 11/09/19 1128  •  ibuprofen (ADVIL,MOTRIN) tablet 600 mg, 600 mg, Oral, Q6H PRN, Mirna Ruelas MD, 600 mg at 11/09/19 1215  •  magnesium oxide (MAGOX) tablet 400 mg, 400 mg, Oral, Daily, Mirna Ruelas MD, 400 mg at 11/09/19 1215  •  sodium chloride 0.9 % flush 10 mL, 10 mL, Intravenous, PRN, Alden Montgomery MD  •  sodium chloride 0.9 % flush 10 mL, 10 mL, Intravenous, Q12H, Jewell, Nelia, APRN, 10 mL at 11/09/19 0847  •  sodium chloride 0.9 % flush 10 mL, 10 mL, Intravenous, PRN, Jewell, Nelia, APRN    Allergies   Allergen Reactions   • Acetaminophen Irritability         Imaging/Results:    MRI brain:   IMPRESSION:  Multiple too numerous to count areas of contrast enhancement  correlating to the areas of increased signal on the noncontrasted  examination. Findings concerning for metastatic process given the extent  of enhancement. Findings less favor matter process due to the extensive  enhancement pattern.          Assessment/Plan     1. Abnormal Brain MRI  2. Balance Problems/Dizziness  3. Headache      Unusual MRI appearance of multiple small enhancing lesions scattered throughout the brain. Not a typical appearance for MS as noted by Radiology.  Wide  differential diagnosis includes infectious conditions (fungal, etc), malignancy, inflammatory lesions.  Recommend CT chest/abd/pelvis, Infectious Disease consult, and LP.  Patient currently does not want LP (or to be in the hospital for that matter), but we reviewed and discussed he potential seriousness of his condition and he was in understanding.        Alexsander Zeng MD

## 2019-11-10 VITALS
SYSTOLIC BLOOD PRESSURE: 116 MMHG | BODY MASS INDEX: 25.39 KG/M2 | OXYGEN SATURATION: 96 % | WEIGHT: 191.6 LBS | HEART RATE: 86 BPM | RESPIRATION RATE: 18 BRPM | TEMPERATURE: 97.5 F | DIASTOLIC BLOOD PRESSURE: 76 MMHG | HEIGHT: 73 IN

## 2019-11-10 PROBLEM — K21.9 GERD (GASTROESOPHAGEAL REFLUX DISEASE): Chronic | Status: ACTIVE | Noted: 2019-11-10

## 2019-11-10 PROBLEM — G93.9 BRAIN LESION: Chronic | Status: ACTIVE | Noted: 2019-11-10

## 2019-11-10 PROBLEM — I10 HTN (HYPERTENSION): Chronic | Status: ACTIVE | Noted: 2019-11-10

## 2019-11-10 PROBLEM — R00.0 TACHYCARDIA: Chronic | Status: ACTIVE | Noted: 2019-11-10

## 2019-11-10 LAB
ANION GAP SERPL CALCULATED.3IONS-SCNC: 12 MMOL/L (ref 5–15)
BASOPHILS # BLD AUTO: 0.04 10*3/MM3 (ref 0–0.2)
BASOPHILS NFR BLD AUTO: 0.3 % (ref 0–1.5)
BUN BLD-MCNC: 13 MG/DL (ref 6–20)
BUN/CREAT SERPL: 12.1 (ref 7–25)
CALCIUM SPEC-SCNC: 9.2 MG/DL (ref 8.6–10.5)
CHLORIDE SERPL-SCNC: 101 MMOL/L (ref 98–107)
CO2 SERPL-SCNC: 26 MMOL/L (ref 22–29)
CREAT BLD-MCNC: 1.07 MG/DL (ref 0.76–1.27)
DEPRECATED RDW RBC AUTO: 41.9 FL (ref 37–54)
EOSINOPHIL # BLD AUTO: 0.01 10*3/MM3 (ref 0–0.4)
EOSINOPHIL NFR BLD AUTO: 0.1 % (ref 0.3–6.2)
ERYTHROCYTE [DISTWIDTH] IN BLOOD BY AUTOMATED COUNT: 12.7 % (ref 12.3–15.4)
GFR SERPL CREATININE-BSD FRML MDRD: 73 ML/MIN/1.73
GLUCOSE BLD-MCNC: 105 MG/DL (ref 65–99)
HCT VFR BLD AUTO: 47.3 % (ref 37.5–51)
HGB BLD-MCNC: 15.4 G/DL (ref 13–17.7)
IMM GRANULOCYTES # BLD AUTO: 0.04 10*3/MM3 (ref 0–0.05)
IMM GRANULOCYTES NFR BLD AUTO: 0.3 % (ref 0–0.5)
INR PPP: 0.99 (ref 0.85–1.16)
LYMPHOCYTES # BLD AUTO: 3.2 10*3/MM3 (ref 0.7–3.1)
LYMPHOCYTES NFR BLD AUTO: 24.5 % (ref 19.6–45.3)
MCH RBC QN AUTO: 29.2 PG (ref 26.6–33)
MCHC RBC AUTO-ENTMCNC: 32.6 G/DL (ref 31.5–35.7)
MCV RBC AUTO: 89.6 FL (ref 79–97)
MONOCYTES # BLD AUTO: 1.18 10*3/MM3 (ref 0.1–0.9)
MONOCYTES NFR BLD AUTO: 9 % (ref 5–12)
NEUTROPHILS # BLD AUTO: 8.57 10*3/MM3 (ref 1.7–7)
NEUTROPHILS NFR BLD AUTO: 65.8 % (ref 42.7–76)
NRBC BLD AUTO-RTO: 0 /100 WBC (ref 0–0.2)
PLATELET # BLD AUTO: 279 10*3/MM3 (ref 140–450)
PMV BLD AUTO: 12.5 FL (ref 6–12)
POTASSIUM BLD-SCNC: 3.8 MMOL/L (ref 3.5–5.2)
PROTHROMBIN TIME: 12.6 SECONDS (ref 11.2–14.3)
RBC # BLD AUTO: 5.28 10*6/MM3 (ref 4.14–5.8)
SODIUM BLD-SCNC: 139 MMOL/L (ref 136–145)
WBC NRBC COR # BLD: 13.04 10*3/MM3 (ref 3.4–10.8)

## 2019-11-10 PROCEDURE — 99217 PR OBSERVATION CARE DISCHARGE MANAGEMENT: CPT | Performed by: FAMILY MEDICINE

## 2019-11-10 PROCEDURE — G0378 HOSPITAL OBSERVATION PER HR: HCPCS

## 2019-11-10 PROCEDURE — 85025 COMPLETE CBC W/AUTO DIFF WBC: CPT | Performed by: FAMILY MEDICINE

## 2019-11-10 PROCEDURE — 80048 BASIC METABOLIC PNL TOTAL CA: CPT | Performed by: FAMILY MEDICINE

## 2019-11-10 PROCEDURE — 85610 PROTHROMBIN TIME: CPT | Performed by: FAMILY MEDICINE

## 2019-11-10 RX ORDER — FAMOTIDINE 20 MG/1
20 TABLET, FILM COATED ORAL
Qty: 60 TABLET | Refills: 0 | Status: SHIPPED | OUTPATIENT
Start: 2019-11-10 | End: 2020-04-20

## 2019-11-10 RX ADMIN — FAMOTIDINE 20 MG: 20 TABLET, FILM COATED ORAL at 09:20

## 2019-11-10 RX ADMIN — METOPROLOL TARTRATE 12.5 MG: 25 TABLET, FILM COATED ORAL at 09:19

## 2019-11-10 RX ADMIN — SODIUM CHLORIDE, PRESERVATIVE FREE 10 ML: 5 INJECTION INTRAVENOUS at 09:22

## 2019-11-10 RX ADMIN — Medication 400 MG: at 09:21

## 2019-11-10 NOTE — PLAN OF CARE
"Problem: Patient Care Overview  Goal: Plan of Care Review   11/10/19 0446   OTHER   Outcome Summary Pt hypertensive at the begining of shift. Resolved and returned to baseline with medication. No c/o pain noted. Pt continues to express frustration and anxiety in regards to the plan of medical interventions for a definitive diagnosis. He stated, \" I will leave tomorrow if something is not done\". He walked unassisted by staff down to the nurses station to request medication for anxiety and was escorted back to his room. The use of his call light for help was discussed. Will continue to monitor.           "

## 2019-11-10 NOTE — DISCHARGE SUMMARY
"    [facility] Medicine Services  DISCHARGE SUMMARY    Patient Name: Man Williamson  : 1970  MRN: [id number]    Date of Admission: 2019  Date of Discharge:  11/10/19    Primary Care Physician: Provider, No Known    Consults     Date and Time Order Name Status Description    2019 0030 Inpatient Neurology Consult General Completed           Hospital Course     Presenting Problem:   Multiple sclerosis exacerbation (CMS/HCC) [G35]    Active Hospital Problems    Diagnosis  POA   • **Brain lesion [G93.9]  Yes   • Tachycardia [R00.0]  Yes   • GERD (gastroesophageal reflux disease) [K21.9]  Yes   • HTN (hypertension) [I10]  Yes   • Possible Multiple sclerosis exacerbation (CMS/HCC) [G35]  Yes   • Marijuana use [F12.90]  Yes   • Tobacco abuse [Z72.0]  Yes   • Bacteriuria [R82.71]  Yes      Resolved Hospital Problems   No resolved problems to display.          Hospital Course:  Man Williamson is a 49 y.o. male who was admitted after presenting to the emergency department complaining of several day history of dizziness.  He also reports some headaches.  His MRI was positive for contrast-enhancing white matter lesions concerning for demyelinating disease.  It is thought that he may have multiple sclerosis.       - Neurology consult has been requested.  Patient denies other complaints or symptoms.  He states he has not been falling down or had changes in his vision.  We discussed proceeding with a work-up including a lumbar puncture, at this time he is declining to have a lumbar puncture stating he does not want to risk being paralyzed.  I reassured him that risk of complication although present is very low with this procedure and I encouraged him to discuss this with neurology.  He is tolerating p.o.  He denies fever.  He appears to be unaccepting of the potential that he could have MS.  He states \"for 50 or 100 years no one in my whole family has had MS.\"     11/10 -patient was seen " by neurology yesterday and infectious disease work-up was recommended.  The patient is refusing to have an lumbar puncture.  He states he will go home and follow-up with infectious disease and neurology as an outpatient.  Given his reluctance to proceed with the work-up I think this is appropriate.  He is not having any fevers he does not appear to have an acute infection.  He is ambulating and tolerating p.o. well.  His blood pressure has been elevated as well as his heart rate.  He states his heart rate is always elevated.  Started him on beta-blocker.  He will need to follow-up with his primary care physician for management of tachycardia and hypertension.  He did have a CT of the abdomen and pelvis which report is pending at the time of discharge.    Review of Systems  General: No fever, chills, fatigue  ENT: No sore throat, trouble swallowing or changes in vision  Respiratory: No shortness of breath, cough, wheezing or fast breathing  Cardiovascular: No chest pain, palpitations, dyspnea with exertion  Gastrointestinal: No nausea vomiting abdominal pain  Musculoskeletal: No difficulty walking, no weakness  Vascular: No cyanosis or clubbing  Lymphatic: No peripheral edema, no lymphadenopathy  Neurologic: Positive headache, denies confusion, positive dizziness  Psychiatric: No changes in mood.  No depression or anxiety.      Discharge Follow Up Recommendations for labs/diagnostics:  Follow-up with neurology next week to continue work-up for brain lesions.   Follow-up with primary care physician to discuss MRI, CT scan results as well as follow-up on blood pressure and heart rate.   Follow-up with infectious disease to consider infectious work-up for uncertain etiology of brain lesions.     Day of Discharge     HPI:   11/10 -see above    Vital Signs:   Temp:  [97.6 °F (36.4 °C)-98.2 °F (36.8 °C)] 97.6 °F (36.4 °C)  Heart Rate:  [] 87  Resp:  [16-18] 18  BP: (111-148)/() 121/76     Physical  Exam:  Constitutional: No acute distress, awake, alert, nontoxic, normal body habitus  Eyes: Pupils equal, sclerae anicteric, no conjunctival injection  HENT: NCAT, mucous membranes moist  Neck: Supple, no thyromegaly, no lymphadenopathy  Respiratory: Clear to auscultation bilaterally, good effort, nonlabored respirations   Cardiovascular: RRR  Gastrointestinal: Positive bowel sounds, soft, nontender, nondistended  Musculoskeletal: No peripheral edema, normal muscle tone for age  Psychiatric: At times tearful, cooperative  Neurologic: Oriented x 3, movements symmetric BUE and BLE, Cranial Nerves grossly intact to confrontation, speech clear and fluent  Skin: No rashes, no jaundice, no petechiae, no mottling     Pertinent  and/or Most Recent Results     Results from last 7 days   Lab Units 11/09/19  0631 11/08/19  1347 11/08/19  1346   WBC 10*3/mm3 7.62 9.16  --    HEMOGLOBIN g/dL 16.9 16.4  --    HEMATOCRIT % 52.8* 50.6  --    PLATELETS 10*3/mm3 296 267  --    SODIUM mmol/L 137  --  139   POTASSIUM mmol/L 4.8  --  3.9   CHLORIDE mmol/L 98  --  100   CO2 mmol/L 26.0  --  25.0   BUN mg/dL 10  --  10   CREATININE mg/dL 1.02  --  1.09   GLUCOSE mg/dL 151*  --  143*   CALCIUM mg/dL 9.6  --  9.5     Results from last 7 days   Lab Units 11/08/19  1346   BILIRUBIN mg/dL 0.6   ALK PHOS U/L 50   ALT (SGPT) U/L 10   AST (SGOT) U/L 10           Invalid input(s): TG, LDLCALC, LDLREALC  Results from last 7 days   Lab Units 11/09/19  0636 11/08/19  1346   TSH uIU/mL  --  2.030   HEMOGLOBIN A1C % 5.50  --    TROPONIN T ng/mL  --  <0.010       Brief Urine Lab Results  (Last result in the past 365 days)      Color   Clarity   Blood   Leuk Est   Nitrite   Protein   CREAT   Urine HCG        11/08/19 1453 Dark Yellow Clear Negative Trace Negative 30 mg/dL (1+)               Microbiology Results Abnormal     None          Imaging Results (All)     Procedure Component Value Units Date/Time    MRI Brain Without Contrast [[id number]]  Collected:  11/08/19 1628     Updated:  11/09/19 1838    Narrative:       EXAMINATION: MRI BRAIN WO CONTRAST - 11/08/2019     INDICATION: Right eye pain and loss of sight. Frontal headache x 1  month.      TECHNIQUE: Routine multiple imaging is obtained of the brain without the  administration gadolinium contrast.     COMPARISON: None     FINDINGS: There is abnormal signal diffusion-weighted imaging within the  right parietal lobe suggesting possible active MS plaque. There is no  evidence of restricted diffusion at this time to suggest evidence of  tumefactive MS. There are areas of abnormal signal intensity seen  scattered throughout the periventricular and subcortical white matter  suggesting either chronic small vessel ischemic change or possibly  developing white matter process. There is some central low signal seen  within several of the lesions within the right periventricular white  matter concerning for possible to developing white matter process such  as multiple sclerosis. Contrast enhancement imaging is recommended for  further evaluation to exclude possible active demyelinating white matter  disease. There is abnormal signal as well seen within the cerebellum.  Abnormal signal seen within the brainstem. Visualized paranasal sinuses  are clear. The mastoid air cells are patent. Pituitary and sella are  unremarkable. Craniovertebral junction is preserved.       Impression:       Scattered areas of abnormal signal intensity seen throughout  the periventricular and subcortical white matter as well as within the  cerebellum and brainstem. There is some edema identified as well as  abnormal signal on diffusion imaging concerning for possible active  demyelinating white matter disease. Enhancement is recommended for  further evaluation of active plaque in lesions. There is no evidence of  restricted diffusion at this time to suggest evidence of tumefactive MS.  Continued follow-up is recommended.       DICTATED:   11/08/2019  EDITED/ls :   11/09/2019        CT Abdomen Pelvis With & Without Contrast [[id number]] Collected:  11/09/19 1731     Updated:  11/09/19 1733    Narrative:       EXAMINATION: CT ABDOMEN PELVIS W WO CONTRAST-      INDICATION: Neoplasm: abdomen, heme/lymphatic, recurrence,  suspected/known; G35-Multiple sclerosis     TECHNIQUE: CT scan of the abdomen and pelvis was prior to and following  intravenous contrast.     The radiation dose reduction device was turned on for each scan per the  ALARA (As Low as Reasonably Achievable) protocol.     COMPARISON: NONE     FINDINGS: The liver and spleen are normal. Pancreas is normal there is  no adrenal mass. There is no renal mass, stone or obstruction. There is  no ascites or aneurysm or retroperitoneal lymphadenopathy. There are  rare sigmoid diverticuli with no features of diverticulitis. There is no  pelvic mass or fluid and there is no inguinal lymphadenopathy.             Impression:       Normal examination          XR Chest 1 View [[id number]] Collected:  11/08/19 1501     Updated:  11/09/19 1655    Narrative:          EXAMINATION: XR CHEST 1 VW - 11/09/2019     INDICATION: Weakness, dizziness, altered mental status.     COMPARISON: None     FINDINGS: Portable chest reveals cardiac and mediastinal silhouettes  within normal limits. The lung fields are grossly clear. No focal  parenchymal opacification is present. No pleural effusion or  pneumothorax. The bony structures are unremarkable. The pulmonary  vascularity is within normal limits.        Impression:       No acute cardiopulmonary disease.     DICTATED:   11/08/2019  EDITED/ls :   11/09/2019           CT Chest With & Without Contrast [[id number]] Updated:  11/09/19 1554    MRI Brain With Contrast [[id number]] Collected:  11/09/19 0751     Updated:  11/09/19 0754    Narrative:       EXAMINATION: MRI BRAIN W CONTRAST-     INDICATION: dizziness, ataxia, ? MS; G35-Multiple sclerosis pain  behind  the right eye, dizziness, ataxia     TECHNIQUE: Routine multiple imaging is obtained of the brain following  the ministration gadolinium contrast.     COMPARISON: NONE     FINDINGS: There are multiple too numerous to count contrast enhancing  lesions scattered throughout the parenchyma. Findings most concerning  for metastatic process. There is less likelyhood of a active  demyelinating white matter process given the extensive areas of  enhancement and involvement. There is minimal surrounding edema.  Continued follow-up is recommended as indicated with evaluation for  primary source recommended.          Impression:       Multiple too numerous to count areas of contrast enhancement  correlating to the areas of increased signal on the noncontrasted  examination. Findings concerning for metastatic process given the extent  of enhancement. Findings less favor matter process due to the extensive  enhancement pattern.                                 Order Current Status    Basic Metabolic Panel In process    CBC & Differential In process    CBC Auto Differential In process    Protime-INR In process        Discharge Details        Discharge Medications      New Medications      Instructions Start Date   famotidine 20 MG tablet  Commonly known as:  PEPCID   20 mg, Oral, 2 Times Daily Before Meals      magnesium oxide 400 (241.3 Mg) MG tablet tablet  Commonly known as:  MAGOX   400 mg, Oral, Daily      metoprolol tartrate 25 MG tablet  Commonly known as:  LOPRESSOR   12.5 mg, Oral, Every 12 Hours Scheduled             Allergies   Allergen Reactions   • Acetaminophen Irritability         Discharge Disposition:  Home or Self Care    Diet:  Hospital:  Diet Order   Procedures   • Diet Regular       Discharge Activity:   Activity Instructions     Activity as Tolerated              CODE STATUS:    Code Status and Medical Interventions:   Ordered at: 11/08/19 2046     Level Of Support Discussed With:    Patient     Code  Status:    CPR     Medical Interventions (Level of Support Prior to Arrest):    Full         No future appointments.    Additional Instructions for the Follow-ups that You Need to Schedule     Discharge Follow-up with PCP   As directed       Currently Documented PCP:    Provider, No Known    PCP Phone Number:    None     Follow Up Details:  next week to check HR and BP         Discharge Follow-up with Specified Provider: Infectious Disease to discuss infection possibilities for brain lesions; 1 Week   As directed      To:  Infectious Disease to discuss infection possibilities for brain lesions    Follow Up:  1 Week         Discharge Follow-up with Specified Provider: Neurology; 1 Week   As directed      To:  Neurology    Follow Up:  1 Week    Follow Up Details:  to continue workup for brain lesions               Time Spent on Discharge:  35 minutes    Electronically signed by Mirna Ruelas MD, 11/10/19, 9:18 AM.

## 2019-11-16 ENCOUNTER — OFFICE VISIT (OUTPATIENT)
Dept: INTERNAL MEDICINE | Facility: CLINIC | Age: 49
End: 2019-11-16

## 2019-11-16 VITALS
DIASTOLIC BLOOD PRESSURE: 70 MMHG | OXYGEN SATURATION: 99 % | WEIGHT: 191 LBS | HEIGHT: 73 IN | BODY MASS INDEX: 25.31 KG/M2 | HEART RATE: 89 BPM | SYSTOLIC BLOOD PRESSURE: 118 MMHG

## 2019-11-16 DIAGNOSIS — G93.9 BRAIN LESION: Primary | ICD-10-CM

## 2019-11-16 DIAGNOSIS — R27.0 ATAXIA: ICD-10-CM

## 2019-11-16 DIAGNOSIS — K59.00 CONSTIPATION, UNSPECIFIED CONSTIPATION TYPE: ICD-10-CM

## 2019-11-16 DIAGNOSIS — R00.0 TACHYCARDIA: ICD-10-CM

## 2019-11-16 DIAGNOSIS — H91.93 DECREASED HEARING OF BOTH EARS: ICD-10-CM

## 2019-11-16 DIAGNOSIS — R39.11 URINARY HESITANCY: ICD-10-CM

## 2019-11-16 DIAGNOSIS — H61.23 BILATERAL IMPACTED CERUMEN: ICD-10-CM

## 2019-11-16 DIAGNOSIS — K21.9 GASTROESOPHAGEAL REFLUX DISEASE WITHOUT ESOPHAGITIS: ICD-10-CM

## 2019-11-16 DIAGNOSIS — M79.605 LEFT LEG PAIN: ICD-10-CM

## 2019-11-16 DIAGNOSIS — R42 DIZZINESS: ICD-10-CM

## 2019-11-16 PROCEDURE — 99204 OFFICE O/P NEW MOD 45 MIN: CPT | Performed by: NURSE PRACTITIONER

## 2019-11-16 NOTE — PROGRESS NOTES
Chief Complaint   Patient presents with   • Hospital Follow up     brain lesions   • Establish Care   • Dizziness     HPI:    49 y.o.male presents for establish care and dizziness, brain lesions, and other neurological problem.  New patient; no prior primary care services.    Patient describes being in his normal health up until around early October.  He denies any prior pertinent history such as hypertension, hyperlipidemia, diabetes, stroke or any chronic conditions but again he had not seen a primary care provider for over 20 years.  At that time was not taking any routine daily medications.      In early October he describes having sudden onset of groin numbness and tingling.  Did not think much of it at the time.  That went away, and then he started having dizziness and difficulty with ambulation.  He only has dizziness if he is moving; relieved with sitting.  Has difficulty with gait due to the dizziness feels off balance and wobbly.  Has to hold on to things to be able to walk much of a distance.  Had been having some headache early on; described as a sharp pain behind his right eye.  He states this has since radiated behind the right ear into the posterior head.  He still has recent complaints of posterior headache.  positive difficulty with hearing with his left ear hearing being worse.  No vision changes other than the pain behind the right eye.  Denies any extremity weakness.  The only current numbness and tingling that he is having is with his left upper extremity which he states just started becoming numb this morning.  He denies any other saddle anesthesia.      Patient went to [facility] emergency room November 8.  brain MRI showed lesion suggesting multiple sclerosis.  Patient was admitted started on high-dose steroids and further work-up.  Repeat MRI noted for unusual MRI appearance of multiple lesions scattered throughout the brain.  Radiology and neurology notes not a typical appearance for  MS.  Wide differential diagnosis such as infectious conditions, malignancy, inflammatory lesions.  CT of the chest abdomen and pelvis were performed negative; and infectious disease consult.  Patient declined a lumbar puncture.  Patient wanted to go home and have further work-up outpatient with infectious disease and neurology.  Patient had tachycardia in the hospital and reports that that is chronic.  Was started on a beta-blocker there.    Since home cont dizziness, difficulty with gait, headache, left arm numbness tingling. He fell last night; ran into the coffee table. Hit left upper leg and hurts, but says his left thigh hurt prior to falling.  Positive constipation; hasn't had a BM for a week. Difficulty starting urinary stream which he thinks has started since starting the metoprolol.  Positive gerd; has been taking pepcid since hospital not helping much.  Has some difficulty starting urine stream with occasional incontinence.  Asking if can come off the metoprolol.  Needs referral for infectious disease outpt. Has appt with Dr. Zeng neurology Tuesday.  Pt is agreeable to LP now if still needed.    HPI completed online per pt.  Neurologic Problem   The patient's primary symptoms include an altered mental status, clumsiness, focal weakness, a loss of balance, memory loss, slurred speech and weakness. The patient's pertinent negatives include no focal sensory loss, near-syncope, syncope or visual change. This is a new problem. The current episode started more than 1 month ago. The neurological problem developed gradually. The problem has been waxing and waning since onset. There was facial focality noted. Associated symptoms include an auditory change, back pain, bladder incontinence, bowel incontinence, confusion, diaphoresis, dizziness, fatigue, headaches, light-headedness, palpitations and shortness of breath. Pertinent negatives include no abdominal pain, aura, chest pain, fever, nausea, neck pain,  vertigo or vomiting. Past treatments include bed rest, drinking, medication and position change. The treatment provided no relief.         Review of Systems   Constitutional: Positive for activity change, appetite change, chills, diaphoresis, fatigue and unexpected weight loss. Negative for fever and unexpected weight gain.   HENT: Positive for hearing loss and voice change. Negative for ear discharge, ear pain, postnasal drip, rhinorrhea, sinus pressure, sneezing and sore throat.    Eyes: Positive for pain. Negative for blurred vision, double vision, photophobia and visual disturbance.   Respiratory: Positive for shortness of breath. Negative for chest tightness and wheezing.    Cardiovascular: Positive for palpitations. Negative for chest pain, leg swelling and near-syncope.   Gastrointestinal: Positive for bowel incontinence, constipation, GERD and indigestion. Negative for abdominal pain, blood in stool, diarrhea, nausea and vomiting.   Endocrine: Positive for cold intolerance. Negative for heat intolerance, polydipsia, polyphagia and polyuria.   Genitourinary: Positive for difficulty urinating and urinary incontinence. Negative for flank pain and frequency.   Musculoskeletal: Positive for arthralgias, back pain, gait problem and myalgias. Negative for joint swelling, neck pain and neck stiffness.   Skin: Negative for rash.   Neurological: Positive for dizziness, focal weakness, speech difficulty, weakness, light-headedness, numbness, headache, loss of balance, memory problem and confusion. Negative for vertigo, syncope and facial asymmetry.   Hematological: Bruises/bleeds easily.   Psychiatric/Behavioral: Positive for memory loss. Negative for self-injury and depressed mood. The patient is nervous/anxious.          [facility]  The following portions of the patient's history were reviewed and updated as appropriate: allergies, current medications, past family history, past medical history, past social history, past  "surgical history and problem list.     Social hx:  Tobacco none  Alcohol occasional  Illicit drug use: positive thc  Drug screen was positive in hospital.  Wife states she gave him a lortab for his headache, and he took one of his mom's anxiety pills; otherwise denies any other illicit drug use.     Past Medical History:   Diagnosis Date   • Kidney stone    • Low back pain    • Pneumonia       Past Surgical History:   Procedure Laterality Date   • TONSILLECTOMY       Allergies   Allergen Reactions   • Acetaminophen Irritability      Family History   Problem Relation Age of Onset   • Hypertension Mother    • Heart defect Father           Current Outpatient Medications:   •  famotidine (PEPCID) 20 MG tablet, Take 1 tablet by mouth 2 (Two) Times a Day Before Meals., Disp: 60 tablet, Rfl: 0  •  magnesium oxide (MAGOX) 400 (241.3 Mg) MG tablet tablet, Take 1 tablet by mouth Daily., Disp: 10 each, Rfl: 0  •  metoprolol tartrate (LOPRESSOR) 25 MG tablet, Take 0.5 tablets by mouth Every 12 (Twelve) Hours., Disp: 60 tablet, Rfl: 0    VITALS:  /70   Pulse 89   Ht 185.4 cm (73\")   Wt 86.6 kg (191 lb)   SpO2 99%   BMI 25.20 kg/m²     Physical Exam   Constitutional: He is oriented to person, place, and time. He appears well-developed and well-nourished. He appears ill. No distress.   HENT:   Head: Normocephalic.   Right Ear: External ear and ear canal normal. cerumen impaction is present.  Left Ear: External ear and ear canal normal. An impacted cerumen is present.  Nose: Nose normal.   Mouth/Throat: Uvula is midline, oropharynx is clear and moist and mucous membranes are normal. No oral lesions.   Eyes: Conjunctivae, EOM and lids are normal. Pupils are equal, round, and reactive to light. Right eye exhibits normal extraocular motion and no nystagmus. Left eye exhibits normal extraocular motion and no nystagmus.   Fundoscopic exam:       The right eye shows no AV nicking and no papilledema. The right eye shows red " reflex.        The left eye shows no AV nicking and no papilledema. The left eye shows red reflex.   Neck: Trachea normal and normal range of motion. Neck supple. No spinous process tenderness and no muscular tenderness present. Normal range of motion present. No thyroid mass and no thyromegaly present.   Cardiovascular: Normal rate, regular rhythm, S1 normal, S2 normal, normal heart sounds and intact distal pulses.   No murmur heard.  No edema   Pulmonary/Chest: Effort normal and breath sounds normal. No respiratory distress.   Abdominal: Soft. Normal appearance and bowel sounds are normal. There is no hepatosplenomegaly. There is no tenderness. There is no rigidity and no guarding. No hernia.   Musculoskeletal: Normal range of motion.        Left upper leg: He exhibits tenderness. He exhibits no bony tenderness, no swelling and no edema.   Normal ROM all major joints   Lymphadenopathy:        Head (right side): No submental, no submandibular and no tonsillar adenopathy present.        Head (left side): No submental, no submandibular and no tonsillar adenopathy present.     He has no cervical adenopathy.   Neurological: He is alert and oriented to person, place, and time. He has normal strength. No cranial nerve deficit or sensory deficit. Coordination and gait abnormal. GCS eye subscore is 4. GCS verbal subscore is 5. GCS motor subscore is 6.   Reflex Scores:       Bicep reflexes are 2+ on the right side and 2+ on the left side.       Brachioradialis reflexes are 2+ on the right side and 2+ on the left side.       Patellar reflexes are 2+ on the right side and 2+ on the left side.       Achilles reflexes are 2+ on the right side and 2+ on the left side.  Holds onto wall to walk   Skin: Skin is warm and dry. Capillary refill takes less than 2 seconds. No rash noted. He is not diaphoretic.   Psychiatric: He has a normal mood and affect. His speech is normal.   Vitals reviewed.    Bilateral ear irrigation completed  per MA.  Right ear canal cleared well; TM intact good light reflex no perf.  Left ear canal slow to clear cerumen; was able to get several large pieces wax from canal. Visualize TM dull light reflex no erythema no perf. Still has cerumen upper portion of canal that unable to remove.    LABS  Results for orders placed or performed during the hospital encounter of 11/08/19   Comprehensive Metabolic Panel   Result Value Ref Range    Glucose 143 (H) 65 - 99 mg/dL    BUN 10 6 - 20 mg/dL    Creatinine 1.09 0.76 - 1.27 mg/dL    Sodium 139 136 - 145 mmol/L    Potassium 3.9 3.5 - 5.2 mmol/L    Chloride 100 98 - 107 mmol/L    CO2 25.0 22.0 - 29.0 mmol/L    Calcium 9.5 8.6 - 10.5 mg/dL    Total Protein 7.4 6.0 - 8.5 g/dL    Albumin 4.10 3.50 - 5.20 g/dL    ALT (SGPT) 10 1 - 41 U/L    AST (SGOT) 10 1 - 40 U/L    Alkaline Phosphatase 50 39 - 117 U/L    Total Bilirubin 0.6 0.2 - 1.2 mg/dL    eGFR Non African Amer 71 >60 mL/min/1.73    Globulin 3.3 gm/dL    A/G Ratio 1.2 g/dL    BUN/Creatinine Ratio 9.2 7.0 - 25.0    Anion Gap 14.0 5.0 - 15.0 mmol/L   Troponin   Result Value Ref Range    Troponin T <0.010 0.000 - 0.030 ng/mL   Magnesium   Result Value Ref Range    Magnesium 1.9 1.6 - 2.6 mg/dL   Urinalysis With Microscopic If Indicated (No Culture) - Urine, Clean Catch   Result Value Ref Range    Color, UA Dark Yellow (A) Yellow, Straw    Appearance, UA Clear Clear    pH, UA 5.5 5.0 - 8.0    Specific Gravity, UA 1.029 1.001 - 1.030    Glucose, UA Negative Negative    Ketones, UA 80 mg/dL (3+) (A) Negative    Bilirubin, UA Moderate (2+) (A) Negative    Blood, UA Negative Negative    Protein, UA 30 mg/dL (1+) (A) Negative    Leuk Esterase, UA Trace (A) Negative    Nitrite, UA Negative Negative    Urobilinogen, UA 1.0 E.U./dL 0.2 - 1.0 E.U./dL   CBC Auto Differential   Result Value Ref Range    WBC 9.16 3.40 - 10.80 10*3/mm3    RBC 5.64 4.14 - 5.80 10*6/mm3    Hemoglobin 16.4 13.0 - 17.7 g/dL    Hematocrit 50.6 37.5 - 51.0 %    MCV  89.7 79.0 - 97.0 fL    MCH 29.1 26.6 - 33.0 pg    MCHC 32.4 31.5 - 35.7 g/dL    RDW 12.4 12.3 - 15.4 %    RDW-SD 41.1 37.0 - 54.0 fl    MPV 11.7 6.0 - 12.0 fL    Platelets 267 140 - 450 10*3/mm3    Neutrophil % 69.8 42.7 - 76.0 %    Lymphocyte % 24.0 19.6 - 45.3 %    Monocyte % 5.2 5.0 - 12.0 %    Eosinophil % 0.5 0.3 - 6.2 %    Basophil % 0.3 0.0 - 1.5 %    Immature Grans % 0.2 0.0 - 0.5 %    Neutrophils, Absolute 6.38 1.70 - 7.00 10*3/mm3    Lymphocytes, Absolute 2.20 0.70 - 3.10 10*3/mm3    Monocytes, Absolute 0.48 0.10 - 0.90 10*3/mm3    Eosinophils, Absolute 0.05 0.00 - 0.40 10*3/mm3    Basophils, Absolute 0.03 0.00 - 0.20 10*3/mm3    Immature Grans, Absolute 0.02 0.00 - 0.05 10*3/mm3    nRBC 0.0 0.0 - 0.2 /100 WBC   Urinalysis, Microscopic Only - Urine, Clean Catch   Result Value Ref Range    RBC, UA 0-2 None Seen, 0-2 /HPF    WBC, UA 0-2 None Seen, 0-2 /HPF    Bacteria, UA 2+ (A) None Seen, Trace /HPF    Squamous Epithelial Cells, UA 0-2 None Seen, 0-2 /HPF    Hyaline Casts, UA 0-6 0 - 6 /LPF    Methodology Automated Microscopy    Vitamin B12   Result Value Ref Range    Vitamin B-12 1,072 (H) 211 - 946 pg/mL   TSH   Result Value Ref Range    TSH 2.030 0.270 - 4.200 uIU/mL   T4, Free   Result Value Ref Range    Free T4 1.58 0.93 - 1.70 ng/dL   Ethanol   Result Value Ref Range    Ethanol <10 0 - 10 mg/dL   Urine Drug Screen - Urine, Clean Catch   Result Value Ref Range    THC, Screen, Urine Positive (A) Negative    Phencyclidine (PCP), Urine Negative Negative    Cocaine Screen, Urine Negative Negative    Methamphetamine, Ur Negative Negative    Opiate Screen Positive (A) Negative    Amphetamine Screen, Urine Negative Negative    Benzodiazepine Screen, Urine Positive (A) Negative    Tricyclic Antidepressants Screen Negative Negative    Methadone Screen, Urine Negative Negative    Barbiturates Screen, Urine Negative Negative    Oxycodone Screen, Urine Negative Negative    Propoxyphene Screen Negative Negative     Buprenorphine, Screen, Urine Negative Negative   CBC Auto Differential   Result Value Ref Range    WBC 7.62 3.40 - 10.80 10*3/mm3    RBC 5.85 (H) 4.14 - 5.80 10*6/mm3    Hemoglobin 16.9 13.0 - 17.7 g/dL    Hematocrit 52.8 (H) 37.5 - 51.0 %    MCV 90.3 79.0 - 97.0 fL    MCH 28.9 26.6 - 33.0 pg    MCHC 32.0 31.5 - 35.7 g/dL    RDW 12.4 12.3 - 15.4 %    RDW-SD 41.3 37.0 - 54.0 fl    MPV 12.5 (H) 6.0 - 12.0 fL    Platelets 296 140 - 450 10*3/mm3   Basic Metabolic Panel   Result Value Ref Range    Glucose 151 (H) 65 - 99 mg/dL    BUN 10 6 - 20 mg/dL    Creatinine 1.02 0.76 - 1.27 mg/dL    Sodium 137 136 - 145 mmol/L    Potassium 4.8 3.5 - 5.2 mmol/L    Chloride 98 98 - 107 mmol/L    CO2 26.0 22.0 - 29.0 mmol/L    Calcium 9.6 8.6 - 10.5 mg/dL    eGFR Non African Amer 77 >60 mL/min/1.73    BUN/Creatinine Ratio 9.8 7.0 - 25.0    Anion Gap 13.0 5.0 - 15.0 mmol/L   Sedimentation Rate   Result Value Ref Range    Sed Rate 28 (H) 0 - 20 mm/hr   C-reactive Protein   Result Value Ref Range    C-Reactive Protein 0.06 0.00 - 0.50 mg/dL   Hemoglobin A1c   Result Value Ref Range    Hemoglobin A1C 5.50 4.80 - 5.60 %   Basic Metabolic Panel   Result Value Ref Range    Glucose 105 (H) 65 - 99 mg/dL    BUN 13 6 - 20 mg/dL    Creatinine 1.07 0.76 - 1.27 mg/dL    Sodium 139 136 - 145 mmol/L    Potassium 3.8 3.5 - 5.2 mmol/L    Chloride 101 98 - 107 mmol/L    CO2 26.0 22.0 - 29.0 mmol/L    Calcium 9.2 8.6 - 10.5 mg/dL    eGFR Non African Amer 73 >60 mL/min/1.73    BUN/Creatinine Ratio 12.1 7.0 - 25.0    Anion Gap 12.0 5.0 - 15.0 mmol/L   Protime-INR   Result Value Ref Range    Protime 12.6 11.2 - 14.3 Seconds    INR 0.99 0.85 - 1.16   CBC Auto Differential   Result Value Ref Range    WBC 13.04 (H) 3.40 - 10.80 10*3/mm3    RBC 5.28 4.14 - 5.80 10*6/mm3    Hemoglobin 15.4 13.0 - 17.7 g/dL    Hematocrit 47.3 37.5 - 51.0 %    MCV 89.6 79.0 - 97.0 fL    MCH 29.2 26.6 - 33.0 pg    MCHC 32.6 31.5 - 35.7 g/dL    RDW 12.7 12.3 - 15.4 %    RDW-SD 41.9  37.0 - 54.0 fl    MPV 12.5 (H) 6.0 - 12.0 fL    Platelets 279 140 - 450 10*3/mm3    Neutrophil % 65.8 42.7 - 76.0 %    Lymphocyte % 24.5 19.6 - 45.3 %    Monocyte % 9.0 5.0 - 12.0 %    Eosinophil % 0.1 (L) 0.3 - 6.2 %    Basophil % 0.3 0.0 - 1.5 %    Immature Grans % 0.3 0.0 - 0.5 %    Neutrophils, Absolute 8.57 (H) 1.70 - 7.00 10*3/mm3    Lymphocytes, Absolute 3.20 (H) 0.70 - 3.10 10*3/mm3    Monocytes, Absolute 1.18 (H) 0.10 - 0.90 10*3/mm3    Eosinophils, Absolute 0.01 0.00 - 0.40 10*3/mm3    Basophils, Absolute 0.04 0.00 - 0.20 10*3/mm3    Immature Grans, Absolute 0.04 0.00 - 0.05 10*3/mm3    nRBC 0.0 0.0 - 0.2 /100 WBC   Light Blue Top   Result Value Ref Range    Extra Tube hold for add-on    Green Top (Gel)   Result Value Ref Range    Extra Tube Hold for add-ons.    Lavender Top   Result Value Ref Range    Extra Tube hold for add-on    Gold Top - SST   Result Value Ref Range    Extra Tube Hold for add-ons.    Manual Differential   Result Value Ref Range    Neutrophil % 85.0 (H) 42.7 - 76.0 %    Lymphocyte % 10.0 (L) 19.6 - 45.3 %    Monocyte % 1.0 (L) 5.0 - 12.0 %    Eosinophil % 0.0 (L) 0.3 - 6.2 %    Basophil % 0.0 0.0 - 1.5 %    Atypical Lymphocyte % 4.0 0.0 - 5.0 %    Neutrophils Absolute 6.48 1.70 - 7.00 10*3/mm3    Lymphocytes Absolute 0.76 0.70 - 3.10 10*3/mm3    Monocytes Absolute 0.08 (L) 0.10 - 0.90 10*3/mm3    Eosinophils Absolute 0.00 0.00 - 0.40 10*3/mm3    Basophils Absolute 0.00 0.00 - 0.20 10*3/mm3    RBC Morphology Normal Normal    Smudge Cells Slight/1+ None Seen    Large Platelets Slight/1+ None Seen     Study Result     EXAMINATION: CT ABDOMEN/PELVIS W WO CONTRAST-11/09/2019:      INDICATION: Neoplasm: abdomen, heme/lymphatic, recurrence,  suspected/known; G35-Multiple sclerosis.     TECHNIQUE: CT scan of the abdomen and pelvis was performed prior to and  following intravenous contrast.     The radiation dose reduction device was turned on for each scan per the  ALARA (As Low as Reasonably  Achievable) protocol.     COMPARISON: NONE.     FINDINGS: The liver and spleen are normal. The pancreas is normal.   There is no adrenal mass. There is no renal mass, stone or obstruction.  There is no ascites, aneurysm or retroperitoneal lymphadenopathy. There  are rare sigmoid diverticuli with no features of diverticulitis. There  is no pelvic mass or fluid, and there is no inguinal lymphadenopathy.     IMPRESSION:  Normal examination.     D:  11/09/2019  E:  11/11/2019           This report was finalized on 11/12/2019 10:00 AM by Dr. Jethro Devries MD.     Study Result     EXAMINATION: CT CHEST W WO CONTRAST-11/09/2019:      INDICATION: Neoplasm: abdomen, heme/lymphatic, recurrence,  suspected/known; G35-Multiple sclerosis, metastatic workup.     TECHNIQUE: Multiple axial CT imaging was obtained of the chest with and  without the administration of intravenous contrast.     The radiation dose reduction device was turned on for each scan per the  ALARA (As Low as Reasonably Achievable) protocol.     COMPARISON: NONE.     FINDINGS: The thyroid is homogeneous. No mediastinal mass or adenopathy.  The cardiac chambers are within normal limits. No pericardial effusion.  No bulky hilar or axillary lymphadenopathy. The lung parenchyma is  grossly clear. No parenchymal consolidation, no pulmonary mass or  nodule. There is no pleural effusion or pneumothorax. The bony  structures reveal degenerative changes seen within the spine and pelvis.     IMPRESSION:  No CT evidence of acute intrathoracic abnormality.     D:  11/11/2019  E:  11/11/2019           This report was finalized on 11/13/2019 8:42 AM by Dr. Francisca Ordaz MD.     Study Result     EXAMINATION: MRI BRAIN W CONTRAST - 11/08/2019     INDICATION:  G35-Multiple sclerosis. Pain behind the right eye,  dizziness, ataxia.     TECHNIQUE: Routine multilevel imaging was obtained of the brain  following the administration of gadolinium contrast.        COMPARISON:  None     FINDINGS: There are multiple fxm-foyeowig-oe-count contrast enhancing  lesions scattered throughout the parenchyma. Findings are most  concerning for a  metastatic process. There is less likelihood of active  demyelinating white matter process given the extensive areas of  enhancement and involvement. There is minimal surrounding edema.  Continued follow-up is recommended as indicated with evaluation for  primary source recommended.     IMPRESSION:  Multiple ddx-ysnsvynd-ea-count areas of contrast enhancement  correlating to the areas of increased signal on the noncontrasted  examination. Findings concerning for metastatic process given the extent  of enhancement. Findings less favor white matter process due to the  extensive enhancement pattern.      DICTATED:   11/09/2019  EDITED/ls :   11/10/2019      This report was finalized on 11/11/2019 10:56 AM by Dr. Francisca Ordaz MD.       ASSESSMENT/PLAN  Man was seen today for hospital follow up, establish care and dizziness.    Diagnoses and all orders for this visit:    Brain lesion  Comments:  pt is agreeable to LP. keep appt with neurology Tuesday. placed referral for infectious disease.  Orders:  -     Ambulatory Referral to Infectious Disease  Also needs spinal scan with ext numbness tingling; saddle anesthesia resolved.    Ataxia  Significant problem for patient; difficulty walking; unable to drive.  Keep fu with neurology  Needs personal assistance with ambulation to prevent falls. Wife available staying with him    Dizziness  Significant problem for patient; difficulty walking; unable to drive.  Keep fu with neurology  Needs personal assistance with ambulation to prevent falls. Wife available staying with him    Left leg pain  Comments:  check xray.  Orders:  -     XR femur 2 vw left; Future    Constipation, unspecified constipation type  Comments:  miralax, increase water and fiber in diet.  Orders:  -     polyethylene glycol (MIRALAX) powder  powder; Take 17 g by mouth Daily.    Gastroesophageal reflux disease without esophagitis  Comments:  cont pepcid. provided sample of nexium. if helps can get OTC or let me know and would send RX.    Tachycardia  Comments:  improved; cont metoprolol for now.  -  magnesium oxide (magox) 400 mg tab  Bilateral impacted cerumen  Comments:  improved; still with residual wax in left ear. debrox    Decreased hearing of both ears  Comments:  follow up with neuro  -referral to ENT    Urinary hesitancy  -  tamsulosin (flomax) 0.4mg capsule     48 yo male ill with neurological findings. Pt wants to pursue workup on outpt basis.  No changes since in hospital but certainly no improvements either. Afebrile. Has appt tues with neuro. Placed referral for infectious disease. Needs LP. I will follow along closely with specialities.  Check leg xray  Will also likely need mri spine for assessment possible lesions but will let neuro address with also need for LP.  Cont metoprolol for now  Added flomax.  Will have him fu with me in 2 weeks.    Needs primary care health maint  Never had colonoscopy.  Needs updated vaccines.  Pt prefers to wait for these items, and I am fine waiting on these till we see what is going on with neurological changes.    I discussed the patients findings and my recommendations with patient.  Patient was encouraged to keep me informed of any acute changes, lack of improvement, or any new concerning symptoms.    Patient voiced understanding of all instructions and denied further questions.      FOLLOW-UP  Return in about 2 weeks, or if symptoms worsen or fail to improve, for Recheck.    Electronically signed by:    MIKAYLA Malik  11/16/2019

## 2019-11-18 RX ORDER — TAMSULOSIN HYDROCHLORIDE 0.4 MG/1
1 CAPSULE ORAL DAILY
Qty: 30 CAPSULE | Refills: 0 | Status: SHIPPED | OUTPATIENT
Start: 2019-11-18 | End: 2019-12-18

## 2019-11-19 ENCOUNTER — HOSPITAL ENCOUNTER (OUTPATIENT)
Dept: GENERAL RADIOLOGY | Facility: HOSPITAL | Age: 49
Discharge: HOME | End: 2019-11-19
Admitting: NURSE PRACTITIONER

## 2019-11-19 DIAGNOSIS — M79.605 LEFT LEG PAIN: ICD-10-CM

## 2019-11-19 PROCEDURE — 73552 X-RAY EXAM OF FEMUR 2/>: CPT

## 2019-11-21 ENCOUNTER — TRANSCRIBE ORDERS (OUTPATIENT)
Dept: ADMINISTRATIVE | Facility: HOSPITAL | Age: 49
End: 2019-11-21

## 2019-11-21 DIAGNOSIS — G03.9 MENINGITIS: ICD-10-CM

## 2019-11-21 DIAGNOSIS — R93.1 ABNORMAL FINDINGS DIAGNOSTIC IMAGING OF HEART AND CORONARY CIRCULATION: Primary | ICD-10-CM

## 2019-11-26 ENCOUNTER — HOSPITAL ENCOUNTER (OUTPATIENT)
Dept: GENERAL RADIOLOGY | Facility: HOSPITAL | Age: 49
Discharge: HOME | End: 2019-11-26
Admitting: PSYCHIATRY & NEUROLOGY

## 2019-11-26 VITALS
OXYGEN SATURATION: 95 % | WEIGHT: 188 LBS | SYSTOLIC BLOOD PRESSURE: 126 MMHG | HEART RATE: 111 BPM | HEIGHT: 73 IN | DIASTOLIC BLOOD PRESSURE: 80 MMHG | TEMPERATURE: 97.6 F | RESPIRATION RATE: 18 BRPM | BODY MASS INDEX: 24.92 KG/M2

## 2019-11-26 DIAGNOSIS — R93.1 ABNORMAL FINDINGS DIAGNOSTIC IMAGING OF HEART AND CORONARY CIRCULATION: ICD-10-CM

## 2019-11-26 DIAGNOSIS — G03.9 MENINGITIS: ICD-10-CM

## 2019-11-26 LAB
APPEARANCE CSF: CLEAR
COLOR CSF: COLORLESS
COLOR SPUN CSF: COLORLESS
CRYPTOC AG CSF QL LA: NEGATIVE
GLUCOSE CSF-MCNC: 68 MG/DL (ref 40–70)
LYMPHOCYTES NFR CSF MANUAL: 100 % (ref 62–100)
PROT CSF-MCNC: 48.5 MG/DL (ref 15–45)
RBC # CSF MANUAL: 1 /MM3 (ref 0–5)
SPECIMEN VOL CSF: 10 ML
TUBE # CSF: 3
WBC # CSF MANUAL: 24 /MM3 (ref 0–5)

## 2019-11-26 PROCEDURE — 88184 FLOWCYTOMETRY/ TC 1 MARKER: CPT

## 2019-11-26 PROCEDURE — 87102 FUNGUS ISOLATION CULTURE: CPT | Performed by: PSYCHIATRY & NEUROLOGY

## 2019-11-26 PROCEDURE — 77003 FLUOROGUIDE FOR SPINE INJECT: CPT

## 2019-11-26 PROCEDURE — 82164 ANGIOTENSIN I ENZYME TEST: CPT | Performed by: PSYCHIATRY & NEUROLOGY

## 2019-11-26 PROCEDURE — 87327 CRYPTOCOCCUS NEOFORM AG IA: CPT | Performed by: PSYCHIATRY & NEUROLOGY

## 2019-11-26 PROCEDURE — 87015 SPECIMEN INFECT AGNT CONCNTJ: CPT | Performed by: PSYCHIATRY & NEUROLOGY

## 2019-11-26 PROCEDURE — 86592 SYPHILIS TEST NON-TREP QUAL: CPT | Performed by: PSYCHIATRY & NEUROLOGY

## 2019-11-26 PROCEDURE — 83916 OLIGOCLONAL BANDS: CPT | Performed by: PSYCHIATRY & NEUROLOGY

## 2019-11-26 PROCEDURE — 25010000003 LIDOCAINE 1 % SOLUTION: Performed by: RADIOLOGY

## 2019-11-26 PROCEDURE — 88112 CYTOPATH CELL ENHANCE TECH: CPT | Performed by: PSYCHIATRY & NEUROLOGY

## 2019-11-26 PROCEDURE — 88185 FLOWCYTOMETRY/TC ADD-ON: CPT

## 2019-11-26 PROCEDURE — 82784 ASSAY IGA/IGD/IGG/IGM EACH: CPT | Performed by: PSYCHIATRY & NEUROLOGY

## 2019-11-26 PROCEDURE — 82040 ASSAY OF SERUM ALBUMIN: CPT | Performed by: PSYCHIATRY & NEUROLOGY

## 2019-11-26 PROCEDURE — 82945 GLUCOSE OTHER FLUID: CPT | Performed by: PSYCHIATRY & NEUROLOGY

## 2019-11-26 PROCEDURE — 89051 BODY FLUID CELL COUNT: CPT | Performed by: PSYCHIATRY & NEUROLOGY

## 2019-11-26 PROCEDURE — 87205 SMEAR GRAM STAIN: CPT | Performed by: PSYCHIATRY & NEUROLOGY

## 2019-11-26 PROCEDURE — 87070 CULTURE OTHR SPECIMN AEROBIC: CPT | Performed by: PSYCHIATRY & NEUROLOGY

## 2019-11-26 PROCEDURE — 82042 OTHER SOURCE ALBUMIN QUAN EA: CPT | Performed by: PSYCHIATRY & NEUROLOGY

## 2019-11-26 PROCEDURE — 84157 ASSAY OF PROTEIN OTHER: CPT | Performed by: PSYCHIATRY & NEUROLOGY

## 2019-11-26 RX ORDER — LIDOCAINE HYDROCHLORIDE 10 MG/ML
10 INJECTION, SOLUTION INFILTRATION; PERINEURAL ONCE
Status: COMPLETED | OUTPATIENT
Start: 2019-11-26 | End: 2019-11-26

## 2019-11-26 RX ORDER — SODIUM CHLORIDE 0.9 % (FLUSH) 0.9 %
3 SYRINGE (ML) INJECTION EVERY 12 HOURS SCHEDULED
Status: DISCONTINUED | OUTPATIENT
Start: 2019-11-26 | End: 2019-11-27 | Stop reason: HOSPADM

## 2019-11-26 RX ADMIN — LIDOCAINE HYDROCHLORIDE 10 ML: 10 INJECTION, SOLUTION INFILTRATION; PERINEURAL at 10:39

## 2019-11-26 NOTE — POST-PROCEDURE NOTE
Radiology Procedure    Pre-procedure: procedure, risks discussed with patient. Patient indicated understanding and consented to procedure     Procedure Performed: lumbar puncture     IV Sedation and/or Anesthesia:  No    Complications: none    Preliminary Findings: pending    Specimen Removed: 10cc clear, colorless CSF    Estimated Blood Loss:  0ml    Post-Procedure Diagnosis: pending    Post-Procedure Plan: encourage fluids, bed rest x 2 hours    Standard Discharge Instructions Given:yes     JASON Solis  11/26/19  10:31 AM

## 2019-11-27 ENCOUNTER — TELEPHONE (OUTPATIENT)
Dept: INFUSION THERAPY | Facility: HOSPITAL | Age: 49
End: 2019-11-27

## 2019-11-29 ENCOUNTER — HOSPITAL ENCOUNTER (EMERGENCY)
Facility: HOSPITAL | Age: 49
Discharge: HOME | End: 2019-11-30
Attending: EMERGENCY MEDICINE | Admitting: EMERGENCY MEDICINE

## 2019-11-29 ENCOUNTER — APPOINTMENT (OUTPATIENT)
Dept: GENERAL RADIOLOGY | Facility: HOSPITAL | Age: 49
End: 2019-11-29

## 2019-11-29 VITALS
DIASTOLIC BLOOD PRESSURE: 105 MMHG | RESPIRATION RATE: 15 BRPM | WEIGHT: 188 LBS | SYSTOLIC BLOOD PRESSURE: 131 MMHG | OXYGEN SATURATION: 94 % | HEART RATE: 109 BPM | HEIGHT: 73 IN | TEMPERATURE: 98 F | BODY MASS INDEX: 24.92 KG/M2

## 2019-11-29 DIAGNOSIS — S42.292A CLOSED FRACTURE OF HEAD OF LEFT HUMERUS, INITIAL ENCOUNTER: ICD-10-CM
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
## 2019-11-29 DIAGNOSIS — S43.015A ANTERIOR DISLOCATION OF LEFT SHOULDER, INITIAL ENCOUNTER: Primary | ICD-10-CM

## 2019-11-29 LAB
ACE CSF-CCNC: <1.5 U/L
ALBUMIN CSF-MCNC: 22 MG/DL (ref 11–48)
ALBUMIN SERPL-MCNC: 3.9 G/DL (ref 3.5–5.5)
BACTERIA SPEC AEROBE CULT: NORMAL
GRAM STN SPEC: NORMAL
GRAM STN SPEC: NORMAL
IGG CSF-MCNC: 17.7 MG/DL (ref 0–8.6)
IGG SERPL-MCNC: 904 MG/DL (ref 700–1600)
IGG SYNTH RATE SER+CSF CALC-MRATE: 73.7 MG/DAY
OLIGOCLONAL BANDS.IT SER+CSF QL: NORMAL

## 2019-11-29 PROCEDURE — 73020 X-RAY EXAM OF SHOULDER: CPT

## 2019-11-29 PROCEDURE — 25010000002 PROPOFOL 10 MG/ML EMULSION: Performed by: EMERGENCY MEDICINE

## 2019-11-29 PROCEDURE — 99284 EMERGENCY DEPT VISIT MOD MDM: CPT

## 2019-11-29 PROCEDURE — 99152 MOD SED SAME PHYS/QHP 5/>YRS: CPT

## 2019-11-29 PROCEDURE — 25010000002 HYDROMORPHONE PER 4 MG: Performed by: EMERGENCY MEDICINE

## 2019-11-29 PROCEDURE — 25010000002 KETOROLAC TROMETHAMINE PER 15 MG: Performed by: EMERGENCY MEDICINE

## 2019-11-29 PROCEDURE — 25010000002 ONDANSETRON PER 1 MG: Performed by: EMERGENCY MEDICINE

## 2019-11-29 PROCEDURE — 73030 X-RAY EXAM OF SHOULDER: CPT

## 2019-11-29 PROCEDURE — 96375 TX/PRO/DX INJ NEW DRUG ADDON: CPT

## 2019-11-29 PROCEDURE — 96374 THER/PROPH/DIAG INJ IV PUSH: CPT

## 2019-11-29 RX ORDER — ONDANSETRON 2 MG/ML
4 INJECTION INTRAMUSCULAR; INTRAVENOUS ONCE
Status: COMPLETED | OUTPATIENT
Start: 2019-11-29 | End: 2019-11-29

## 2019-11-29 RX ORDER — TRAMADOL HYDROCHLORIDE 50 MG/1
50-100 TABLET ORAL EVERY 6 HOURS PRN
Qty: 15 TABLET | Refills: 0 | Status: SHIPPED | OUTPATIENT
Start: 2019-11-29 | End: 2020-04-20

## 2019-11-29 RX ORDER — PROPOFOL 10 MG/ML
200 VIAL (ML) INTRAVENOUS ONCE
Status: COMPLETED | OUTPATIENT
Start: 2019-11-29 | End: 2019-11-29

## 2019-11-29 RX ORDER — KETOROLAC TROMETHAMINE 15 MG/ML
15 INJECTION, SOLUTION INTRAMUSCULAR; INTRAVENOUS ONCE
Status: COMPLETED | OUTPATIENT
Start: 2019-11-29 | End: 2019-11-29

## 2019-11-29 RX ORDER — HYDROMORPHONE HYDROCHLORIDE 1 MG/ML
0.5 INJECTION, SOLUTION INTRAMUSCULAR; INTRAVENOUS; SUBCUTANEOUS ONCE
Status: COMPLETED | OUTPATIENT
Start: 2019-11-29 | End: 2019-11-29

## 2019-11-29 RX ADMIN — PROPOFOL 45 MG: 10 INJECTION, EMULSION INTRAVENOUS at 22:26

## 2019-11-29 RX ADMIN — KETOROLAC TROMETHAMINE 15 MG: 15 INJECTION, SOLUTION INTRAMUSCULAR; INTRAVENOUS at 23:38

## 2019-11-29 RX ADMIN — ONDANSETRON 4 MG: 2 INJECTION INTRAMUSCULAR; INTRAVENOUS at 20:32

## 2019-11-29 RX ADMIN — HYDROMORPHONE HYDROCHLORIDE 0.5 MG: 1 INJECTION, SOLUTION INTRAMUSCULAR; INTRAVENOUS; SUBCUTANEOUS at 20:32

## 2019-12-02 LAB
LAB AP CASE REPORT: NORMAL
LAB AP FLOW CYTOMETRY SUMMARY: NORMAL
PATH REPORT.FINAL DX SPEC: NORMAL
REAGIN AB CSF QL: NON REACTIVE

## 2019-12-09 ENCOUNTER — INFUSION (OUTPATIENT)
Dept: ONCOLOGY | Facility: HOSPITAL | Age: 49
End: 2019-12-09

## 2019-12-09 VITALS
SYSTOLIC BLOOD PRESSURE: 140 MMHG | HEART RATE: 120 BPM | DIASTOLIC BLOOD PRESSURE: 97 MMHG | RESPIRATION RATE: 16 BRPM | TEMPERATURE: 98.2 F

## 2019-12-09 DIAGNOSIS — G35 MULTIPLE SCLEROSIS (HCC): Primary | ICD-10-CM

## 2019-12-09 PROCEDURE — 25010000003 METHYLPREDNISOLONE PER 125 MG: Performed by: PSYCHIATRY & NEUROLOGY

## 2019-12-09 PROCEDURE — 96365 THER/PROPH/DIAG IV INF INIT: CPT

## 2019-12-09 RX ADMIN — SODIUM CHLORIDE 1000 MG: 9 INJECTION, SOLUTION INTRAVENOUS at 13:11

## 2019-12-10 ENCOUNTER — INFUSION (OUTPATIENT)
Dept: ONCOLOGY | Facility: HOSPITAL | Age: 49
End: 2019-12-10

## 2019-12-10 VITALS
HEART RATE: 101 BPM | DIASTOLIC BLOOD PRESSURE: 93 MMHG | SYSTOLIC BLOOD PRESSURE: 131 MMHG | RESPIRATION RATE: 16 BRPM | TEMPERATURE: 99.3 F

## 2019-12-10 DIAGNOSIS — G35 MULTIPLE SCLEROSIS (HCC): Primary | ICD-10-CM

## 2019-12-10 PROCEDURE — 25010000003 METHYLPREDNISOLONE PER 125 MG: Performed by: PSYCHIATRY & NEUROLOGY

## 2019-12-10 PROCEDURE — 96365 THER/PROPH/DIAG IV INF INIT: CPT

## 2019-12-10 RX ADMIN — SODIUM CHLORIDE 1000 MG: 9 INJECTION, SOLUTION INTRAVENOUS at 13:19

## 2019-12-11 ENCOUNTER — INFUSION (OUTPATIENT)
Dept: ONCOLOGY | Facility: HOSPITAL | Age: 49
End: 2019-12-11

## 2019-12-11 VITALS
HEART RATE: 100 BPM | DIASTOLIC BLOOD PRESSURE: 97 MMHG | TEMPERATURE: 99 F | RESPIRATION RATE: 16 BRPM | SYSTOLIC BLOOD PRESSURE: 144 MMHG

## 2019-12-11 DIAGNOSIS — G35 MULTIPLE SCLEROSIS (HCC): Primary | ICD-10-CM

## 2019-12-11 PROCEDURE — 25010000003 METHYLPREDNISOLONE PER 125 MG: Performed by: PSYCHIATRY & NEUROLOGY

## 2019-12-11 PROCEDURE — 96365 THER/PROPH/DIAG IV INF INIT: CPT

## 2019-12-11 RX ADMIN — SODIUM CHLORIDE 1000 MG: 9 INJECTION, SOLUTION INTRAVENOUS at 12:49

## 2019-12-12 ENCOUNTER — APPOINTMENT (OUTPATIENT)
Dept: ONCOLOGY | Facility: HOSPITAL | Age: 49
End: 2019-12-12

## 2019-12-12 ENCOUNTER — HOSPITAL ENCOUNTER (EMERGENCY)
Facility: HOSPITAL | Age: 49
Discharge: HOME | End: 2019-12-12
Attending: EMERGENCY MEDICINE | Admitting: EMERGENCY MEDICINE

## 2019-12-12 VITALS
SYSTOLIC BLOOD PRESSURE: 133 MMHG | RESPIRATION RATE: 20 BRPM | WEIGHT: 200 LBS | DIASTOLIC BLOOD PRESSURE: 103 MMHG | OXYGEN SATURATION: 90 % | HEART RATE: 85 BPM | BODY MASS INDEX: 25.67 KG/M2 | TEMPERATURE: 98.4 F | HEIGHT: 74 IN

## 2019-12-12 DIAGNOSIS — K59.00 CONSTIPATION, UNSPECIFIED CONSTIPATION TYPE: Primary | ICD-10-CM

## 2019-12-12 PROCEDURE — 99284 EMERGENCY DEPT VISIT MOD MDM: CPT

## 2019-12-12 NOTE — ED PROVIDER NOTES
Subjective   Man Williamson is a 49 y.o male who presents to the ED with complaints of constipation. The patient reports he has not had a bowel movement in 4 weeks. He states Miralax, Colace, and Exlax have been ineffective. He also complains of chills and fatigue. No fever, chest pain, shortness of breath, abdominal pain, dysuria, frequency, or urgency. The patient has a past medical history of multiple sclerosis and is currently prescribed steroids and Norco 7.5. There are no other acute symptoms at this time.      History provided by:  Patient and parent  Constipation   Severity:  Severe  Time since last bowel movement:  4 weeks  Timing:  Constant  Progression:  Unchanged  Chronicity:  New  Context: medication    Context: not dehydration    Stool description:  None produced  Ineffective treatments:  Miralax, stool softeners and laxatives  Associated symptoms: no abdominal pain, no dysuria and no fever        Review of Systems   Constitutional: Positive for chills and fatigue. Negative for fever.   Respiratory: Negative for shortness of breath.    Cardiovascular: Negative for chest pain.   Gastrointestinal: Positive for constipation. Negative for abdominal pain.   Genitourinary: Negative for dysuria, frequency and urgency.   All other systems reviewed and are negative.      Past Medical History:   Diagnosis Date   • Kidney stone    • Low back pain    • Multiple lesions on computed tomography of brain and spine    • Pneumonia        Allergies   Allergen Reactions   • Acetaminophen Irritability       Past Surgical History:   Procedure Laterality Date   • TONSILLECTOMY         Family History   Problem Relation Age of Onset   • Hypertension Mother    • Other Mother    • Coronary artery disease Father        Social History     Socioeconomic History   • Marital status:      Spouse name: Not on file   • Number of children: Not on file   • Years of education: Not on file   • Highest education level: Not on file    Tobacco Use   • Smoking status: Former Smoker     Types: Cigarettes, Cigars     Last attempt to quit: 11/16/2016     Years since quitting: 3.0   • Smokeless tobacco: Former User     Types: Chew   Substance and Sexual Activity   • Alcohol use: Yes     Comment: rare   • Drug use: Yes     Types: Marijuana, Benzodiazepines     Comment: abnormal urine drug screen   • Sexual activity: Yes     Partners: Female     Birth control/protection: Surgical         Objective   Physical Exam   Constitutional: He is oriented to person, place, and time. He appears well-developed and well-nourished. No distress.   HENT:   Head: Normocephalic and atraumatic.   Nose: Nose normal.   Eyes: Conjunctivae are normal. No scleral icterus.   Neck: Normal range of motion. Neck supple.   Cardiovascular: Normal rate, regular rhythm and normal heart sounds.   No murmur heard.  Pulmonary/Chest: Effort normal and breath sounds normal. No respiratory distress.   Abdominal: Soft. There is no tenderness.   Genitourinary:   Genitourinary Comments: Stool bolus palpated on rectal exam. Somewhat successful removal of stool was performed. Enema will be attempted.   Musculoskeletal: Normal range of motion. He exhibits no edema.   Neurological: He is alert and oriented to person, place, and time.   Skin: Skin is warm and dry.   Psychiatric: He has a normal mood and affect. His behavior is normal.   Nursing note and vitals reviewed.      Procedures         ED Course     No results found for this or any previous visit (from the past 24 hour(s)).  Note: In addition to lab results from this visit, the labs listed above may include labs taken at another facility or during a different encounter within the last 24 hours. Please correlate lab times with ED admission and discharge times for further clarification of the services performed during this visit.    No orders to display     Vitals:    12/12/19 1247 12/12/19 1536 12/12/19 1755   BP: 148/100 140/96    BP  "Location:  Left arm    Patient Position:  Sitting    Pulse: 92 88 98   Resp: 22 20    Temp: 97.8 °F (36.6 °C) 98.4 °F (36.9 °C)    TempSrc: Oral Oral    SpO2: 96% 95% 96%   Weight: 90.7 kg (200 lb)     Height: 188 cm (74\")       Medications - No data to display  ECG/EMG Results (last 24 hours)     ** No results found for the last 24 hours. **        No orders to display                     MDM  Number of Diagnoses or Management Options  Constipation, unspecified constipation type: new and requires workup  Diagnosis management comments: The patient had a rectal exam and manual disimpaction was performed.  Some stool was removed.    A soapsuds enema was performed with very minimal success.    I discussed doing a repeat manual disimpaction after the enema with the patient and on further enemas.  He does not desire to pursue that at this given time.    The patient will be discharged with the advised to drink plenty of water, engage in regular light activity, and will be given a course of GoLYTELY to help facilitate passage of stool.    His abdomen has remained nonsurgical with no peritoneal signs throughout the presentation.  Vital signs have remained non-concerning.    Discharged home with the advised to follow-up with primary care physician for recheck within the next week.       Amount and/or Complexity of Data Reviewed  Obtain history from someone other than the patient: yes  Review and summarize past medical records: yes  Independent visualization of images, tracings, or specimens: yes        Final diagnoses:   Constipation, unspecified constipation type       Documentation assistance provided by scrcosmo Hair.  Information recorded by the scribe was done at my direction and has been verified and validated by me.     Boris Hair  12/12/19 1816       Raudel Ocampo MD  12/12/19 1940    "

## 2019-12-13 ENCOUNTER — APPOINTMENT (OUTPATIENT)
Dept: ONCOLOGY | Facility: HOSPITAL | Age: 49
End: 2019-12-13

## 2019-12-16 ENCOUNTER — INFUSION (OUTPATIENT)
Dept: ONCOLOGY | Facility: HOSPITAL | Age: 49
End: 2019-12-16

## 2019-12-16 VITALS
RESPIRATION RATE: 16 BRPM | SYSTOLIC BLOOD PRESSURE: 131 MMHG | HEART RATE: 124 BPM | TEMPERATURE: 99.2 F | DIASTOLIC BLOOD PRESSURE: 84 MMHG

## 2019-12-16 DIAGNOSIS — G35 MULTIPLE SCLEROSIS (HCC): Primary | ICD-10-CM

## 2019-12-16 PROCEDURE — 96365 THER/PROPH/DIAG IV INF INIT: CPT

## 2019-12-16 PROCEDURE — 25010000003 METHYLPREDNISOLONE PER 125 MG: Performed by: PSYCHIATRY & NEUROLOGY

## 2019-12-16 RX ADMIN — SODIUM CHLORIDE 1000 MG: 9 INJECTION, SOLUTION INTRAVENOUS at 14:00

## 2019-12-17 ENCOUNTER — INFUSION (OUTPATIENT)
Dept: ONCOLOGY | Facility: HOSPITAL | Age: 49
End: 2019-12-17

## 2019-12-17 VITALS
RESPIRATION RATE: 20 BRPM | DIASTOLIC BLOOD PRESSURE: 99 MMHG | HEART RATE: 99 BPM | SYSTOLIC BLOOD PRESSURE: 137 MMHG | TEMPERATURE: 97.9 F

## 2019-12-17 DIAGNOSIS — G35 MULTIPLE SCLEROSIS (HCC): Primary | ICD-10-CM

## 2019-12-17 PROCEDURE — 96365 THER/PROPH/DIAG IV INF INIT: CPT

## 2019-12-17 PROCEDURE — 25010000003 METHYLPREDNISOLONE PER 125 MG: Performed by: PSYCHIATRY & NEUROLOGY

## 2019-12-17 RX ADMIN — SODIUM CHLORIDE 1000 MG: 9 INJECTION, SOLUTION INTRAVENOUS at 14:10

## 2019-12-18 ENCOUNTER — OFFICE VISIT (OUTPATIENT)
Dept: INTERNAL MEDICINE | Facility: CLINIC | Age: 49
End: 2019-12-18

## 2019-12-18 VITALS
SYSTOLIC BLOOD PRESSURE: 133 MMHG | WEIGHT: 174 LBS | BODY MASS INDEX: 22.33 KG/M2 | OXYGEN SATURATION: 98 % | HEIGHT: 74 IN | DIASTOLIC BLOOD PRESSURE: 92 MMHG | HEART RATE: 89 BPM

## 2019-12-18 DIAGNOSIS — M25.512 ACUTE PAIN OF LEFT SHOULDER: ICD-10-CM

## 2019-12-18 DIAGNOSIS — R06.02 SHORTNESS OF BREATH: ICD-10-CM

## 2019-12-18 DIAGNOSIS — G35 MULTIPLE SCLEROSIS (HCC): Primary | ICD-10-CM

## 2019-12-18 PROCEDURE — 99214 OFFICE O/P EST MOD 30 MIN: CPT | Performed by: NURSE PRACTITIONER

## 2019-12-18 RX ORDER — HYDROCODONE BITARTRATE AND ACETAMINOPHEN 7.5; 325 MG/1; MG/1
TABLET ORAL
Refills: 0 | COMMUNITY
Start: 2019-12-09 | End: 2020-04-20

## 2019-12-18 RX ORDER — ALBUTEROL SULFATE 90 UG/1
2 AEROSOL, METERED RESPIRATORY (INHALATION) EVERY 4 HOURS PRN
Qty: 1 INHALER | Refills: 2 | Status: SHIPPED | OUTPATIENT
Start: 2019-12-18 | End: 2020-04-20

## 2019-12-18 NOTE — PROGRESS NOTES
"Chief Complaint   Patient presents with   • Multiple Sclerosis       History of Present Illness  49 y.o.male presents for MS follow up.  Recently diagnosed with multiple sclerosis; followed by Dr. Zeng neurology; treated with high dose steroids. Pt did not like the way he felt with steroids; completed. Since then has been walking on his own, less falling. Does note some increased random jerking hands/ext/body and has been having intermittent hiccups.  When gets hiccups feels like sometimes cant breath good his body will jerk and cant catch his breath. Doesn't know if \"in his head or if really can't breath good.\"  Had recent left shoulder displacement after a fall; tx ED put back in location and FU Monday with orthopedics.  Recent constipation; tx ED. Resolved.  Would like a new neurology referral to Dr. Enciso. Has heard good things about his tx for MS.      Review of Systems   Constitutional: Positive for fatigue. Negative for appetite change, chills, diaphoresis, fever and unexpected weight gain.   Respiratory: Positive for chest tightness and shortness of breath.         Hiccups   Gastrointestinal: Positive for constipation.   Genitourinary: Negative for difficulty urinating.   Musculoskeletal: Positive for arthralgias and joint swelling.   Neurological: Positive for tremors and weakness. Negative for dizziness, light-headedness and numbness.         [facility]  The following portions of the patient's history were reviewed and updated as appropriate: allergies, current medications, past family history, past medical history, past social history, past surgical history and problem list.       Past Medical History:   Diagnosis Date   • Kidney stone    • Low back pain    • Multiple lesions on computed tomography of brain and spine    • Pneumonia       Past Surgical History:   Procedure Laterality Date   • TONSILLECTOMY        Allergies   Allergen Reactions   • Acetaminophen Irritability      Family History   Problem " "Relation Age of Onset   • Hypertension Mother    • Other Mother    • Coronary artery disease Father             Current Outpatient Medications:   •  famotidine (PEPCID) 20 MG tablet, Take 1 tablet by mouth 2 (Two) Times a Day Before Meals., Disp: 60 tablet, Rfl: 0  •  HYDROcodone-acetaminophen (NORCO) 7.5-325 MG per tablet, TK 1 T PO  Q 4 H PRF PAIN, Disp: , Rfl: 0  •  magnesium oxide (MAGOX) 400 (241.3 Mg) MG tablet tablet, Take 1 tablet by mouth Daily., Disp: 30 each, Rfl: 1  •  metoprolol tartrate (LOPRESSOR) 25 MG tablet, Take 0.5 tablets by mouth Every 12 (Twelve) Hours., Disp: 60 tablet, Rfl: 0  •  polyethylene glycol (MIRALAX) powder powder, Take 17 g by mouth Daily., Disp: 225 g, Rfl: 1  •  tamsulosin (FLOMAX) 0.4 MG capsule 24 hr capsule, Take 1 capsule by mouth Daily., Disp: 30 capsule, Rfl: 0  •  traMADol (ULTRAM) 50 MG tablet, Take 1-2 tablets by mouth Every 6 (Six) Hours As Needed for Moderate Pain ., Disp: 15 tablet, Rfl: 0    VITALS:  /92   Pulse 89   Ht 188 cm (74\")   Wt 78.9 kg (174 lb)   SpO2 98%   BMI 22.34 kg/m²     Physical Exam   Constitutional: He is oriented to person, place, and time. He appears well-developed and well-nourished. No distress.   HENT:   Head: Normocephalic.   Eyes: Pupils are equal, round, and reactive to light.   Cardiovascular: Normal rate, regular rhythm and normal heart sounds.   Pulmonary/Chest: Effort normal and breath sounds normal. No respiratory distress. He has no wheezes.   Musculoskeletal:        Left shoulder: He exhibits decreased range of motion, tenderness, bony tenderness and pain. He exhibits no swelling.   Neurological: He is alert and oriented to person, place, and time. He exhibits normal muscle tone.   Skin: Skin is warm and dry. Capillary refill takes less than 2 seconds.   Psychiatric: He has a normal mood and affect.   Vitals reviewed.      LABS  No new labs    ASSESSMENT/PLAN  Man was seen today for multiple sclerosis.    Diagnoses and " all orders for this visit:    Multiple sclerosis (CMS/[facility])  -     Ambulatory Referral to Neurology    Shortness of breath  -     albuterol sulfate  (90 Base) MCG/ACT inhaler; Inhale 2 puffs Every 4 (Four) Hours As Needed for Wheezing or Shortness of Air.    Acute pain of left shoulder  Comments:  keep ortho follow up    looks in better spirits today than last we met; walking better today without assistance.  I placed new referral as he wants to change neurologists.   Pt is being prescribed an inhaler and was instructed for appropriate use.  Advised of potential side effects.    I discussed the patients findings and my recommendations with patient.  Patient was encouraged to keep me informed of any acute changes, lack of improvement, or any new concerning symptoms.    Patient voiced understanding of all instructions and denied further questions.      FOLLOW-UP  Return in about 6 months (around 6/18/2020), or if symptoms worsen or fail to improve, for Recheck.    Electronically signed by:    MIKAYLA Malik  12/18/2019

## 2019-12-19 ENCOUNTER — TELEPHONE (OUTPATIENT)
Dept: INTERNAL MEDICINE | Facility: CLINIC | Age: 49
End: 2019-12-19

## 2019-12-19 NOTE — TELEPHONE ENCOUNTER
SPOUSE IS REQUESTING AN ORDER FOR A SPACER TO GO WITH THE PATIENT'S ALBUTEROL INHALER THAT BRENNAN ORDERED. SPOUSE STATES THAT PROPER USES OF THE INHALER IS DIFFICULT FOR THE PATIENT WITH HIS DIAGNOSIS OF MS AND FEELS THE SPACER WOULD ALLOW BETTER INHALATION OF THE PRODUCT.     CALL BACK [phone]

## 2019-12-20 NOTE — TELEPHONE ENCOUNTER
Called patient to notify of message, no answer. Left VM to call the office, number and hours given.

## 2020-01-03 ENCOUNTER — OFFICE VISIT (OUTPATIENT)
Dept: NEUROLOGY | Facility: CLINIC | Age: 50
End: 2020-01-03

## 2020-01-03 ENCOUNTER — LAB (OUTPATIENT)
Dept: LAB | Facility: HOSPITAL | Age: 50
End: 2020-01-03

## 2020-01-03 VITALS
DIASTOLIC BLOOD PRESSURE: 84 MMHG | HEIGHT: 74 IN | WEIGHT: 171.8 LBS | SYSTOLIC BLOOD PRESSURE: 126 MMHG | HEART RATE: 112 BPM | BODY MASS INDEX: 22.05 KG/M2 | OXYGEN SATURATION: 98 %

## 2020-01-03 DIAGNOSIS — G35 MULTIPLE SCLEROSIS (HCC): ICD-10-CM

## 2020-01-03 DIAGNOSIS — R06.6 INTRACTABLE HICCUPS: ICD-10-CM

## 2020-01-03 DIAGNOSIS — G35 MULTIPLE SCLEROSIS (HCC): Primary | ICD-10-CM

## 2020-01-03 PROBLEM — G93.9 BRAIN LESION: Chronic | Status: RESOLVED | Noted: 2019-11-10 | Resolved: 2020-01-03

## 2020-01-03 PROCEDURE — 86255 FLUORESCENT ANTIBODY SCREEN: CPT

## 2020-01-03 PROCEDURE — 80074 ACUTE HEPATITIS PANEL: CPT

## 2020-01-03 PROCEDURE — 36415 COLL VENOUS BLD VENIPUNCTURE: CPT

## 2020-01-03 PROCEDURE — 86480 TB TEST CELL IMMUN MEASURE: CPT

## 2020-01-03 PROCEDURE — 99215 OFFICE O/P EST HI 40 MIN: CPT | Performed by: PSYCHIATRY & NEUROLOGY

## 2020-01-03 RX ORDER — DIVALPROEX SODIUM 500 MG/1
500 TABLET, EXTENDED RELEASE ORAL DAILY
Qty: 30 TABLET | Refills: 10 | Status: SHIPPED | OUTPATIENT
Start: 2020-01-03 | End: 2020-01-06 | Stop reason: SDUPTHER

## 2020-01-03 NOTE — PROGRESS NOTES
Subjective   Patient ID: Man Williamson is a 49 y.o. male     Chief Complaint   Patient presents with   • Multiple Sclerosis        Neurologic Problem   The patient's primary symptoms include an altered mental status, clumsiness, focal sensory loss, focal weakness, a loss of balance, memory loss, slurred speech, a visual change and weakness. The patient's pertinent negatives include no near-syncope or syncope. This is a chronic problem. The current episode started more than 1 month ago. The neurological problem developed insidiously. The problem has been waxing and waning since onset. There was left-sided, lower extremity and upper extremity focality noted. Associated symptoms include an auditory change, an aura, bladder incontinence, bowel incontinence, confusion, dizziness, fatigue, headaches, light-headedness, shortness of breath and vertigo. Pertinent negatives include no abdominal pain, back pain, chest pain, diaphoresis, fever, nausea, neck pain, palpitations or vomiting. Past treatments include bed rest and position change. The treatment provided no relief.       49 y.o. male referred by Lisa Samuel for RRMS.      Pt admitted 11/8/19 for several day history of dizziness. October 2019 new groin N/T and unsteady gait.      UDS + THC, opiates, BZD  MRI Brain, my review of films, multiple enhancing white matter lesions.     CSF 11/26/19 8 OCB, IgG syn rate 73.7; protein 48.5    Treated with IVMP for 5 days in 12/2019.  Felt better for 10 days but sx have now returned.      Frequent hiccups, decreased attention and concentration.  Gait is unsteady.  Numbness in groin has resolved.    Four recent falls.  Dislocated shoulder on 11/29/19.     Reports intermittent hiccups.     Past Medical History:   Diagnosis Date   • Kidney stone    • Low back pain    • Multiple lesions on computed tomography of brain and spine    • Pneumonia      Family History   Problem Relation Age of Onset   • Hypertension Mother    • Other  Mother    • Coronary artery disease Father      Social History     Socioeconomic History   • Marital status:      Spouse name: Not on file   • Number of children: Not on file   • Years of education: Not on file   • Highest education level: Not on file   Tobacco Use   • Smoking status: Former Smoker     Types: Cigarettes, Cigars     Last attempt to quit: 11/16/2016     Years since quitting: 3.1   • Smokeless tobacco: Former User     Types: Chew   Substance and Sexual Activity   • Alcohol use: Yes     Comment: rare   • Drug use: Yes     Types: Marijuana, Benzodiazepines     Comment: abnormal urine drug screen   • Sexual activity: Yes     Partners: Female     Birth control/protection: Surgical       Review of Systems   Constitutional: Positive for fatigue. Negative for activity change, diaphoresis, fever and unexpected weight change.   HENT: Negative for facial swelling, hearing loss, tinnitus, trouble swallowing and voice change.    Eyes: Negative for photophobia, pain and visual disturbance.   Respiratory: Positive for shortness of breath. Negative for apnea, cough and choking.    Cardiovascular: Negative for chest pain, palpitations and near-syncope.   Gastrointestinal: Positive for bowel incontinence. Negative for abdominal pain, constipation, nausea and vomiting.   Endocrine: Negative for cold intolerance.   Genitourinary: Positive for bladder incontinence. Negative for difficulty urinating, frequency and urgency.   Musculoskeletal: Negative for arthralgias, back pain, gait problem, myalgias, neck pain and neck stiffness.   Skin: Negative for rash.   Allergic/Immunologic: Negative for immunocompromised state.   Neurological: Positive for dizziness, vertigo, focal weakness, weakness, light-headedness, headaches and loss of balance. Negative for tremors, seizures, syncope, facial asymmetry, speech difficulty and numbness.   Hematological: Negative for adenopathy.   Psychiatric/Behavioral: Positive for  "confusion and memory loss. Negative for decreased concentration, hallucinations and sleep disturbance. The patient is not nervous/anxious.        Objective     Vitals:    01/03/20 1428   BP: 126/84   Pulse: 112   SpO2: 98%   Weight: 77.9 kg (171 lb 12.8 oz)   Height: 188 cm (74\")       Neurologic Exam     Mental Status   Oriented to person, place, and time.   Registration: recalls 3 of 3 objects. Recall at 5 minutes: recalls 3 of 3 objects. Follows 3 step commands.   Attention: normal. Concentration: normal.   Speech: speech is normal   Level of consciousness: alert  Knowledge: good and consistent with education.   Able to name object. Able to read. Able to repeat. Able to write. Normal comprehension.     Cranial Nerves     CN II   Visual fields full to confrontation.   Visual acuity: normal  Right visual field deficit: none  Left visual field deficit: none     CN III, IV, VI   Pupils are equal, round, and reactive to light.  Extraocular motions are normal.   Right pupil: Shape: regular. Reactivity: brisk. Consensual response: intact.   Left pupil: Shape: regular. Reactivity: brisk. Consensual response: intact.   Nystagmus: none   Diplopia: none  Ophthalmoparesis: none  Upgaze: normal  Downgaze: normal  Conjugate gaze: present  Vestibulo-ocular reflex: present    CN V   Facial sensation intact.   Right corneal reflex: normal  Left corneal reflex: normal    CN VII   Right facial weakness: none  Left facial weakness: none    CN VIII   Hearing: intact    CN IX, X   Palate: symmetric  Right gag reflex: normal  Left gag reflex: normal    CN XI   Right sternocleidomastoid strength: normal  Left sternocleidomastoid strength: normal    CN XII   Tongue: not atrophic  Fasciculations: absent  Tongue deviation: none    Motor Exam   Muscle bulk: normal  Overall muscle tone: normal  Right arm tone: normal  Left arm tone: normal  Right leg tone: normal  Left leg tone: normal    Strength   Strength 5/5 throughout.     Sensory Exam "   Light touch normal.   Vibration normal.   Proprioception normal.   Pinprick normal.     Gait, Coordination, and Reflexes     Gait  Gait: spastic and wide-based    Coordination   Romberg: positive  Finger to nose coordination: normal  Heel to shin coordination: normal  Tandem walking coordination: abnormal    Tremor   Resting tremor: absent  Intention tremor: absent  Action tremor: absent    Reflexes   Reflexes 2+ except as noted.       Physical Exam   Constitutional: He is oriented to person, place, and time. Vital signs are normal. He appears well-developed and well-nourished.   HENT:   Head: Normocephalic and atraumatic.   Eyes: Pupils are equal, round, and reactive to light. EOM and lids are normal.   Fundoscopic exam:       The right eye shows no exudate, no hemorrhage and no papilledema. The right eye shows venous pulsations.        The left eye shows no exudate, no hemorrhage and no papilledema. The left eye shows venous pulsations.   Neck: Normal range of motion and phonation normal. Normal carotid pulses present. Carotid bruit is not present. No thyroid mass and no thyromegaly present.   Cardiovascular: Normal rate, regular rhythm and normal heart sounds.   Pulmonary/Chest: Effort normal.   Neurological: He is oriented to person, place, and time. He has normal strength. He has an abnormal Romberg Test and an abnormal Tandem Gait Test. He has a normal Finger-Nose-Finger Test and a normal Heel to Godinez Test.   Skin: Skin is warm and dry.   Psychiatric: He has a normal mood and affect. His speech is normal.   Nursing note and vitals reviewed.      Hospital Outpatient Visit on 11/26/2019   Component Date Value Ref Range Status   • WBC, CSF 11/26/2019 24* 0 - 5 /mm3 Final   • RBC, CSF 11/26/2019 1  0 - 5 /mm3 Final   • Color, CSF 11/26/2019 Colorless  Colorless Final   • Supernatant Color, CSF 11/26/2019 Colorless  Colorless Final   • Appearance, CSF 11/26/2019 Clear  Clear Final   • Volume, CSF 11/26/2019 10.0   mL Final   • Tube Number, CSF 11/26/2019 3   Final   • Glucose, CSF 11/26/2019 68  40 - 70 mg/dL Final   • Protein, Total (CSF) 11/26/2019 48.5* 15.0 - 45.0 mg/dL Final   • IgG, CSF 11/26/2019 17.7* 0.0 - 8.6 mg/dL Final   • Albumin, CSF 11/26/2019 22  11 - 48 mg/dL Final   • Albumin 11/26/2019 3.9  3.5 - 5.5 g/dL Final   • IgG 11/26/2019 904  700 - 1600 mg/dL Final   • IgG Synthesis Rate, CSF 11/26/2019 73.7* -9.9 TO +3.3 mg/day Final   • Oligoclonal Bands, CSF 11/26/2019 Comment   Final    Comment: Eight (8) oligoclonal bands were observed in the CSF, which were not  detected in the serum sample.  Interpretation:   Criteria for Positivity: Four (4) or more oligoclonal bands observed   only in the CSF have been shown to be most consistent with MS   using our method. [Kimmie AS, Dallin EL, Connie STEELE, and   Faith JA: Cerebrospinal Fluid Oligoclonal Bands in the Diagnosis   of Multiple Sclerosis. Am J Clin Pathol 120(5):672-675, 2003].   Oligoclonal bands that are present only in the CSF have been   associated with a variety of inflammatory brain diseases such as   multiple sclerosis (MS), subacute encephalitis, neurosyphilis, etc.   Increased IgG in the CSF is not specific for MS, but is an indication   of chronic neural inflammation. Clinical correlation indicated.   Approximately 2-3% of clinically confirmed MS patients show little   or no evidence of oligoclonal bands in the CSF; however oligoclonal   bands may develop as the disease progresses.   Oligoclonal Banding                            testing performed using Isoelectric Focusing   (IEF) and immunoblotting methodology.   • ACE CSF 11/26/2019 <1.5  <2.9 U/L Final   • CSF Culture 11/26/2019 No growth at 3 days   Corrected   • Gram Stain 11/26/2019 Rare (1+) WBCs per low power field   Final   • Gram Stain 11/26/2019 No organisms seen   Final   • VDRL, Quantitative, CSF 11/26/2019 Non Reactive  Non Gerry:<1:1 Final   • Cryptococcal Antigen, CSF  11/26/2019 Negative  Negative Final   • Fungus Culture 11/26/2019 No fungus isolated at 5 weeks   Preliminary   • Lymphocytes, CSF 11/26/2019 100  62 - 100 % Final    25 CELL DIFFERENTIAL   • Case Report 11/26/2019    Final                    Value:Non-gynecologic Cytology                          Case: [id number]                                  Authorizing Provider:  Alexsander Zeng MD Collected:           11/26/2019 01:52 PM          [facility]   Received:            11/26/2019 01:52 PM                                 XRAY                                                                         Pathologist:           Sal Pritchard MD                                                             Specimen:    CSF Sarepta, Cytology and Flow Cytometry per Dr. Zeng                              • Final Diagnosis 11/26/2019    Final                    Value:This result contains rich text formatting which cannot be displayed here.   • Flow Cytometry Summary 11/26/2019    Final                    Value:This result contains rich text formatting which cannot be displayed here.         Assessment/Plan     Problem List Items Addressed This Visit        Respiratory    Intractable hiccups    Current Assessment & Plan     Start Depakote  mg qhs            Nervous and Auditory    Multiple sclerosis (CMS/HCC) - Primary    Current Assessment & Plan     Extensive changes of demyelinating disease    Start Aubagio 14 mg daily    Pre labs          Relevant Medications    methylPREDNISolone sodium succinate (SOLU-Medrol) 1,000 mg in sodium chloride 0.9 % 100 mL IVPB (Completed)    methylPREDNISolone sodium succinate (SOLU-Medrol) 1,000 mg in sodium chloride 0.9 % 100 mL IVPB (Completed)    methylPREDNISolone sodium succinate (SOLU-Medrol) 1,000 mg in sodium chloride 0.9 % 100 mL IVPB (Completed)    methylPREDNISolone sodium succinate (SOLU-Medrol) 1,000 mg in sodium chloride 0.9 %  100 mL IVPB (Completed)    methylPREDNISolone sodium succinate (SOLU-Medrol) 1,000 mg in sodium chloride 0.9 % 100 mL IVPB (Completed)    Other Relevant Orders    Neuromyelitis Optica (NMO) Auto Antibody, IgG    Myelin [id number]    Hepatitis Panel, Acute    QuantiFERON TB Plus Client Incubated             No follow-ups on file.

## 2020-01-04 LAB
HAV IGM SERPL QL IA: NORMAL
HBV CORE IGM SERPL QL IA: NORMAL
HBV SURFACE AG SERPL QL IA: NORMAL
HCV AB SER DONR QL: NORMAL
HOLD SPECIMEN: NORMAL

## 2020-01-06 ENCOUNTER — TELEPHONE (OUTPATIENT)
Dept: NEUROLOGY | Facility: CLINIC | Age: 50
End: 2020-01-06

## 2020-01-06 RX ORDER — DIVALPROEX SODIUM 500 MG/1
1000 TABLET, EXTENDED RELEASE ORAL DAILY
Qty: 60 TABLET | Refills: 10 | Status: SHIPPED | OUTPATIENT
Start: 2020-01-06 | End: 2020-02-05

## 2020-01-06 NOTE — TELEPHONE ENCOUNTER
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 The prescription you gave him for the jerking and burping did not seem to help at all.     Is this a pill that needs a few days?  What can I do to help him?    Thank you for everything.     Lisa Williamson

## 2020-01-07 LAB
AQP4 H2O CHANNEL IGG SERPL QL: NORMAL
FUNGUS WND CULT: NORMAL

## 2020-01-08 LAB
QUANTIFERON CRITERIA: NORMAL
QUANTIFERON MITOGEN VALUE: >10 IU/ML
QUANTIFERON NIL VALUE: 0 IU/ML
QUANTIFERON TB1 AG VALUE: 0.01 IU/ML
QUANTIFERON TB2 AG VALUE: 0 IU/ML
QUANTIFERON-TB GOLD PLUS: NEGATIVE

## 2020-01-13 ENCOUNTER — LAB (OUTPATIENT)
Dept: LAB | Facility: HOSPITAL | Age: 50
End: 2020-01-13

## 2020-01-13 DIAGNOSIS — G35 MULTIPLE SCLEROSIS (HCC): Primary | ICD-10-CM

## 2020-01-13 PROCEDURE — 83520 IMMUNOASSAY QUANT NOS NONAB: CPT

## 2020-01-13 PROCEDURE — 36415 COLL VENOUS BLD VENIPUNCTURE: CPT

## 2020-01-13 PROCEDURE — 86255 FLUORESCENT ANTIBODY SCREEN: CPT

## 2020-01-16 LAB — REF LAB TEST RESULTS: NORMAL

## 2020-01-17 ENCOUNTER — LAB (OUTPATIENT)
Dept: LAB | Facility: HOSPITAL | Age: 50
End: 2020-01-17

## 2020-01-17 ENCOUNTER — OFFICE VISIT (OUTPATIENT)
Dept: NEUROLOGY | Facility: CLINIC | Age: 50
End: 2020-01-17

## 2020-01-17 VITALS — OXYGEN SATURATION: 98 % | WEIGHT: 171.74 LBS | HEART RATE: 50 BPM | HEIGHT: 74 IN | BODY MASS INDEX: 22.04 KG/M2

## 2020-01-17 DIAGNOSIS — G35 MULTIPLE SCLEROSIS (HCC): ICD-10-CM

## 2020-01-17 DIAGNOSIS — G35 MULTIPLE SCLEROSIS (HCC): Primary | ICD-10-CM

## 2020-01-17 DIAGNOSIS — F33.2 SEVERE EPISODE OF RECURRENT MAJOR DEPRESSIVE DISORDER, WITHOUT PSYCHOTIC FEATURES (HCC): ICD-10-CM

## 2020-01-17 DIAGNOSIS — R06.6 INTRACTABLE HICCUPS: ICD-10-CM

## 2020-01-17 PROCEDURE — 99214 OFFICE O/P EST MOD 30 MIN: CPT | Performed by: PSYCHIATRY & NEUROLOGY

## 2020-01-17 PROCEDURE — 80164 ASSAY DIPROPYLACETIC ACD TOT: CPT

## 2020-01-17 PROCEDURE — 36415 COLL VENOUS BLD VENIPUNCTURE: CPT

## 2020-01-17 PROCEDURE — 85025 COMPLETE CBC W/AUTO DIFF WBC: CPT

## 2020-01-17 PROCEDURE — 80053 COMPREHEN METABOLIC PANEL: CPT

## 2020-01-17 RX ORDER — QUETIAPINE FUMARATE 25 MG/1
25-50 TABLET, FILM COATED ORAL NIGHTLY
Qty: 60 TABLET | Refills: 5 | Status: SHIPPED | OUTPATIENT
Start: 2020-01-17 | End: 2020-04-20

## 2020-01-17 RX ORDER — PREDNISONE 20 MG/1
TABLET ORAL
Qty: 12 TABLET | Refills: 0 | Status: SHIPPED | OUTPATIENT
Start: 2020-01-17 | End: 2020-04-20

## 2020-01-17 NOTE — PROGRESS NOTES
Subjective   Patient ID: Man Williamson is a 49 y.o. male     Chief Complaint   Patient presents with   • Multiple Sclerosis     2 week follow up         History of Present Illness    49 y.o. male returns in follow up for RRMS, intractable hiccups.  Last visit on 1/3/20 started Aubagio, Depakote, ordered labs.     MOG - neg  NMO reordered due to insufficient sample  Hep pnl, TB -  Neg    Balance is worsening, memory is not as clear.  Mood is irritable.      Hiccups stopped after increasing Depakote ER 1000 mg qhs.     Problem history:     Pt admitted 11/8/19 for several day history of dizziness. October 2019 new groin N/T and unsteady gait.      UDS + THC, opiates, BZD  MRI Brain, my review of films, multiple enhancing white matter lesions.     CSF 11/26/19 8 OCB, IgG syn rate 73.7; protein 48.5    Treated with IVMP for 5 days in 12/2019.  Felt better for 10 days but sx have now returned.      Frequent hiccups, decreased attention and concentration.  Gait is unsteady.  Numbness in groin has resolved.    Four recent falls.  Dislocated shoulder on 11/29/19.     Reports intermittent hiccups.     Past Medical History:   Diagnosis Date   • Kidney stone    • Low back pain    • Multiple lesions on computed tomography of brain and spine    • Pneumonia      Family History   Problem Relation Age of Onset   • Hypertension Mother    • Other Mother    • Coronary artery disease Father      Social History     Socioeconomic History   • Marital status:      Spouse name: Not on file   • Number of children: Not on file   • Years of education: Not on file   • Highest education level: Not on file   Tobacco Use   • Smoking status: Former Smoker     Types: Cigarettes, Cigars     Last attempt to quit: 11/16/2016     Years since quitting: 3.1   • Smokeless tobacco: Former User     Types: Chew   Substance and Sexual Activity   • Alcohol use: Yes     Comment: rare   • Drug use: Yes     Types: Marijuana, Benzodiazepines     Comment:  "abnormal urine drug screen   • Sexual activity: Yes     Partners: Female     Birth control/protection: Surgical       Review of Systems   Constitutional: Positive for fatigue. Negative for activity change and unexpected weight change.   HENT: Negative for facial swelling, hearing loss, tinnitus, trouble swallowing and voice change.    Eyes: Negative for photophobia, pain and visual disturbance.   Respiratory: Negative for apnea, cough and choking.    Gastrointestinal: Negative for constipation.   Endocrine: Negative for cold intolerance.   Genitourinary: Negative for difficulty urinating, frequency and urgency.   Musculoskeletal: Positive for gait problem. Negative for arthralgias, myalgias and neck stiffness.   Skin: Negative for rash.   Allergic/Immunologic: Negative for immunocompromised state.   Neurological: Positive for speech difficulty. Negative for tremors, seizures, facial asymmetry and numbness.   Hematological: Negative for adenopathy.   Psychiatric/Behavioral: Positive for agitation, behavioral problems, confusion, decreased concentration and dysphoric mood. Negative for hallucinations and sleep disturbance. The patient is not nervous/anxious.        Objective     Vitals:    01/17/20 1510   Pulse: 50   SpO2: 98%   Weight: 77.9 kg (171 lb 11.8 oz)   Height: 188 cm (74.02\")       Neurologic Exam     Mental Status   Registration: recalls 3 of 3 objects. Recall at 5 minutes: recalls 3 of 3 objects. Follows 3 step commands.   Attention: normal. Concentration: normal.   Level of consciousness: alert  Knowledge: good and consistent with education.   Able to name object. Able to read. Able to repeat. Able to write. Normal comprehension.     Cranial Nerves     CN II   Visual fields full to confrontation.   Visual acuity: normal  Right visual field deficit: none  Left visual field deficit: none     CN III, IV, VI   Right pupil: Shape: regular. Reactivity: brisk. Consensual response: intact.   Left pupil: Shape: " regular. Reactivity: brisk. Consensual response: intact.   Nystagmus: none   Diplopia: none  Ophthalmoparesis: none  Upgaze: normal  Downgaze: normal  Conjugate gaze: present  Vestibulo-ocular reflex: present    CN V   Facial sensation intact.   Right corneal reflex: normal  Left corneal reflex: normal    CN VII   Right facial weakness: none  Left facial weakness: none    CN VIII   Hearing: intact    CN IX, X   Palate: symmetric  Right gag reflex: normal  Left gag reflex: normal    CN XI   Right sternocleidomastoid strength: normal  Left sternocleidomastoid strength: normal    CN XII   Tongue: not atrophic  Fasciculations: absent  Tongue deviation: none    Motor Exam   Muscle bulk: normal  Overall muscle tone: normal  Right arm tone: normal  Left arm tone: normal  Right leg tone: normal  Left leg tone: normal    Sensory Exam   Light touch normal.   Vibration normal.   Proprioception normal.   Pinprick normal.     Gait, Coordination, and Reflexes     Gait  Gait: spastic and wide-based    Tremor   Resting tremor: absent  Intention tremor: absent  Action tremor: absent    Reflexes   Reflexes 2+ except as noted.       Physical Exam   Constitutional: He appears well-developed and well-nourished.   Nursing note and vitals reviewed.      Lab on 01/03/2020   Component Date Value Ref Range Status   • NMO/AQP4 FACS, S 01/03/2020    Final    Quantity was not sufficient for analysis.  Notified your facility 1/7/20   • QuantiFERON Criteria 01/03/2020 Comment   Final    The QuantiFERON-TB Gold Plus result is determined by subtracting  the Nil value from either TB antigen (Ag) tube. The mitogen tube  serves as a control for the test.   • QUANTIFERON TB1 AG VALUE 01/03/2020 0.01  IU/mL Final   • QUANTIFERON TB2 AG VALUE 01/03/2020 0.00  IU/mL Final   • QuantiFERON Nil Value 01/03/2020 0.00  IU/mL Final   • QuantiFERON Mitogen Value 01/03/2020 >10.00  IU/mL Final   • QUANTIFERON-TB GOLD PLUS 01/03/2020 Negative  Negative Final     The specimen received for QuantiFERON testing was incubated by the  ordering institution.  Specific procedures outlined in our Directory  of Services and in the package insert for the QuantiFERON Gold  (In Tube) test must be followed to enable for proper stimulation of  cells for the production of interferon gamma.   • Hepatitis B Surface Ag 01/03/2020 Non-Reactive  Non-Reactive Final   • Hep A IgM 01/03/2020 Non-Reactive  Non-Reactive Final   • Hep B C IgM 01/03/2020 Non-Reactive  Non-Reactive Final   • Hepatitis C Ab 01/03/2020 Non-Reactive  Non-Reactive Final   • Extra Tube 01/03/2020 Hold for add-ons.   Final    Auto resulted.   • Miscellaneous Lab Test Result 01/03/2020 See attached report   Final         Assessment/Plan     Problem List Items Addressed This Visit        Respiratory    Intractable hiccups    Current Assessment & Plan     Sx improved on Depakote             Nervous and Auditory    Multiple sclerosis (CMS/HCC) - Primary    Current Assessment & Plan     Tolerating Aubagio     Prednisone taper     CBC, CMP         Relevant Medications    methylPREDNISolone sodium succinate (SOLU-Medrol) 1,000 mg in sodium chloride 0.9 % 100 mL IVPB (Completed)    methylPREDNISolone sodium succinate (SOLU-Medrol) 1,000 mg in sodium chloride 0.9 % 100 mL IVPB (Completed)    methylPREDNISolone sodium succinate (SOLU-Medrol) 1,000 mg in sodium chloride 0.9 % 100 mL IVPB (Completed)    methylPREDNISolone sodium succinate (SOLU-Medrol) 1,000 mg in sodium chloride 0.9 % 100 mL IVPB (Completed)    methylPREDNISolone sodium succinate (SOLU-Medrol) 1,000 mg in sodium chloride 0.9 % 100 mL IVPB (Completed)    Other Relevant Orders    CBC & Differential    Comprehensive Metabolic Panel    Valproic Acid Level, Total      Other Visit Diagnoses     Severe episode of recurrent major depressive disorder, without psychotic features (CMS/HCC)        Relevant Medications    QUEtiapine (SEROquel) 25 MG tablet             No follow-ups  on file.

## 2020-01-18 LAB
ALBUMIN SERPL-MCNC: 4 G/DL (ref 3.5–5.2)
ALBUMIN/GLOB SERPL: 1.2 G/DL
ALP SERPL-CCNC: 63 U/L (ref 39–117)
ALT SERPL W P-5'-P-CCNC: 6 U/L (ref 1–41)
ANION GAP SERPL CALCULATED.3IONS-SCNC: 13.9 MMOL/L (ref 5–15)
AST SERPL-CCNC: 10 U/L (ref 1–40)
BASOPHILS # BLD AUTO: 0.05 10*3/MM3 (ref 0–0.2)
BASOPHILS NFR BLD AUTO: 0.5 % (ref 0–1.5)
BILIRUB SERPL-MCNC: 0.3 MG/DL (ref 0.2–1.2)
BUN BLD-MCNC: 11 MG/DL (ref 6–20)
BUN/CREAT SERPL: 10.4 (ref 7–25)
CALCIUM SPEC-SCNC: 9.9 MG/DL (ref 8.6–10.5)
CHLORIDE SERPL-SCNC: 95 MMOL/L (ref 98–107)
CO2 SERPL-SCNC: 29.1 MMOL/L (ref 22–29)
CREAT BLD-MCNC: 1.06 MG/DL (ref 0.76–1.27)
DEPRECATED RDW RBC AUTO: 40.3 FL (ref 37–54)
EOSINOPHIL # BLD AUTO: 0.15 10*3/MM3 (ref 0–0.4)
EOSINOPHIL NFR BLD AUTO: 1.6 % (ref 0.3–6.2)
ERYTHROCYTE [DISTWIDTH] IN BLOOD BY AUTOMATED COUNT: 13 % (ref 12.3–15.4)
GFR SERPL CREATININE-BSD FRML MDRD: 74 ML/MIN/1.73
GLOBULIN UR ELPH-MCNC: 3.3 GM/DL
GLUCOSE BLD-MCNC: 91 MG/DL (ref 65–99)
HCT VFR BLD AUTO: 47.8 % (ref 37.5–51)
HGB BLD-MCNC: 16.5 G/DL (ref 13–17.7)
IMM GRANULOCYTES # BLD AUTO: 0.05 10*3/MM3 (ref 0–0.05)
IMM GRANULOCYTES NFR BLD AUTO: 0.5 % (ref 0–0.5)
LYMPHOCYTES # BLD AUTO: 2.37 10*3/MM3 (ref 0.7–3.1)
LYMPHOCYTES NFR BLD AUTO: 24.9 % (ref 19.6–45.3)
MCH RBC QN AUTO: 29.8 PG (ref 26.6–33)
MCHC RBC AUTO-ENTMCNC: 34.5 G/DL (ref 31.5–35.7)
MCV RBC AUTO: 86.3 FL (ref 79–97)
MONOCYTES # BLD AUTO: 0.88 10*3/MM3 (ref 0.1–0.9)
MONOCYTES NFR BLD AUTO: 9.3 % (ref 5–12)
NEUTROPHILS # BLD AUTO: 6 10*3/MM3 (ref 1.7–7)
NEUTROPHILS NFR BLD AUTO: 63.2 % (ref 42.7–76)
NRBC BLD AUTO-RTO: 0 /100 WBC (ref 0–0.2)
PLATELET # BLD AUTO: 218 10*3/MM3 (ref 140–450)
PMV BLD AUTO: 13 FL (ref 6–12)
POTASSIUM BLD-SCNC: 4.7 MMOL/L (ref 3.5–5.2)
PROT SERPL-MCNC: 7.3 G/DL (ref 6–8.5)
RBC # BLD AUTO: 5.54 10*6/MM3 (ref 4.14–5.8)
SODIUM BLD-SCNC: 138 MMOL/L (ref 136–145)
VALPROATE SERPL-MCNC: 104 MCG/ML (ref 50–125)
WBC NRBC COR # BLD: 9.5 10*3/MM3 (ref 3.4–10.8)

## 2020-01-21 LAB — AQP4 H2O CHANNEL IGG SERPL QL: NEGATIVE

## 2020-01-22 ENCOUNTER — TELEPHONE (OUTPATIENT)
Dept: NEUROLOGY | Facility: CLINIC | Age: 50
End: 2020-01-22

## 2020-01-22 ENCOUNTER — SPECIALTY PHARMACY (OUTPATIENT)
Dept: ONCOLOGY | Facility: HOSPITAL | Age: 50
End: 2020-01-22

## 2020-01-22 NOTE — TELEPHONE ENCOUNTER
----- Message from Man Williamson sent at 1/21/2020  9:34 PM EST -----  Regarding: Prescription Question  Contact: [phone]  Good morning Doc    Is there a way that you can find out about more samples or something regarding the Aubagio?  Also as far as the steroid he is taking I don't see it doing much. I am noticing that he is having trouble with his sleep I gave him the Quetiapine and he woke up thinking he was in another time. It was strange.     Please let me know what to do I'm at a loss here.     Thank you  Lisa Williamson  [phone]

## 2020-01-27 ENCOUNTER — TELEPHONE (OUTPATIENT)
Dept: NEUROLOGY | Facility: CLINIC | Age: 50
End: 2020-01-27

## 2020-01-27 NOTE — TELEPHONE ENCOUNTER
----- Message from Man Williamson sent at 1/25/2020  6:12 PM EST -----  Regarding: Prescription Question  Contact: [phone]  Charles Benavides    We received a letter from [employer] today about the appeal for the Aubagio, [facility] is saying they have 30 days to come to a decision. This is too long to wait for him to get back on this medicine.     Please let me know if there is another medicine he can take. He is slipping backwards more everyday. The steroids did not seem to make a difference at all this time.     Is there any way you can put my phone number in his chart, when anybody calls his phone he does not understand what they are talking about and he can't make appointments because he doesn't know what days that are a available because of my appointments and other appointments of his.  My number is [phone].    Thank you for your help.  Lisa Williamson   [phone]

## 2020-01-27 NOTE — PROGRESS NOTES
Mailed written educational material for Aubagio following phone call attempts. Provided with my contact information and instructed to call if any questions/concerns should arise.

## 2020-02-04 ENCOUNTER — TREATMENT (OUTPATIENT)
Dept: PHYSICAL THERAPY | Facility: CLINIC | Age: 50
End: 2020-02-04

## 2020-02-04 DIAGNOSIS — G35 MS (MULTIPLE SCLEROSIS) (HCC): Primary | ICD-10-CM

## 2020-02-04 DIAGNOSIS — M25.512 LEFT SHOULDER PAIN, UNSPECIFIED CHRONICITY: ICD-10-CM

## 2020-02-04 DIAGNOSIS — M75.02 ADHESIVE CAPSULITIS OF LEFT SHOULDER: ICD-10-CM

## 2020-02-04 PROCEDURE — 97162 PT EVAL MOD COMPLEX 30 MIN: CPT | Performed by: PHYSICAL THERAPIST

## 2020-02-04 NOTE — PROGRESS NOTES
Physical Therapy Initial Evaluation and Plan of Care        Patient: Man Williamson   : 1970  Diagnosis/ICD-10 Code:  MS (multiple sclerosis) (CMS/[facility]) [G35]  Referring practitioner: Kal Benavides MD  Date of Initial Visit: 2020  Today's Date: 2020  Patient seen for 1 sessions           Subjective Evaluation    History of Present Illness  Mechanism of injury: Pt has MS, on 2019, at home become dizzy, fell into wall/doorway. Dislocated left shoulder, resulted in a Hill-Sach Lesion.  ER had to perform a reduction under anaesthesia.  In sling for 2 weeks.      CURRENT COMPLAINTS  Biggest complaint is Left shoulder pain and stiffness. No night pain. Left anterior shoulder pain intermittent.  Left shoulder feels weak. Poor balance, fatigues easily.      Patient Occupation: Disabled due to MS.   Quality of life: good    Pain  Current pain ratin  At best pain ratin  At worst pain ratin (Left shoulder)  Progression: improved    Social Support  Lives with: parents    Hand dominance: right    Diagnostic Tests  X-ray: abnormal    Treatments  No previous or current treatments           Objective       Static Posture     Head  Forward.    Shoulders  Elevated.    Scapulae  Left winging, left elevated, left protracted and right protracted.    Thoracic Spine  Convex curve right and hyperkyphosis.    Lumbar Spine   Decreased lordosis.     Observations   Left Shoulder   Positive for atrophy (left shoulder girdle mild).     Cervical/Thoracic Screen   Cervical range of motion within normal limits  Cervical range of motion within normal limits with the following exceptions: CROM is WNL in all Planes of Motion    Active Range of Motion   Left Shoulder   Flexion: 72 degrees   Extension: 44 degrees   Abduction: 68 degrees   External rotation 0°: 31 degrees   Internal rotation 0°: 80 degrees     Right Shoulder   Normal active range of motion    Left Wrist   Normal active range of motion    Right Wrist    Normal active range of motion    Lumbar   Normal active range of motion    Passive Range of Motion   Left Shoulder   Flexion: 122 degrees with pain  Extension: WFL  Abduction: 108 degrees with pain  Adduction: 20 degrees   External rotation 90°: 49 degrees with pain  Internal rotation 90°: 30 degrees with pain    Right Shoulder   Normal passive range of motion    Additional Passive Range of Motion Details  Capsular End feel in all planes    Strength/Myotome Testing     Left Shoulder     Isolated Muscles   Anterior deltoid: 4   Biceps: 5   Infraspinatus: 4+   Lower trapezius: 4-   Middle trapezius: 4-   Posterior deltoid: 5   Rhomboids: 4   Subscapularis: 5   Supraspinatus: 5   Teres minor: 4+   Triceps: 5     Right Shoulder     Isolated Muscles   Anterior deltoid: 5   Biceps: 5   Infraspinatus: 5   Lower trapezius: 4+   Middle trapezius: 4+   Posterior deltoid: 5   Rhomboids: 5   Subscapularis: 5   Supraspinatus: 5   Teres minor: 5   Triceps: 5     Left Wrist/Hand   Normal wrist strength    Right Wrist/Hand   Normal wrist strength    Left Hip   Planes of Motion   Flexion: 4+  Extension: 4  Abduction: 4    Right Hip   Planes of Motion   Flexion: 4+  Extension: 4  Abduction: 4    Left Knee   Flexion: 5  Extension: 4+    Right Knee   Flexion: 5  Extension: 4+    Left Ankle/Foot   Dorsiflexion: 4  Plantar flexion: 4+    Right Ankle/Foot   Dorsiflexion: 4  Plantar flexion: 4+    Ambulation   Weight-Bearing Status   Weight-Bearing Status (Left): full weight bearing   Weight-Bearing Status (Right): full weight-bearing    Assistive device used: none    Observational Gait   Decreased walking speed and stride length.   Left arm swing: decreased  Right arm swing: decreased  Base of support: increased    Additional Observational Gait Details  Mild ataxia in LE.    Quality of Movement During Gait   Trunk  Forward lean.     Functional Assessment     Single Leg Stance   Left: 2 seconds  Right: 2 seconds         Assessment & Plan      Assessment  Impairments: abnormal coordination, abnormal gait, abnormal or restricted ROM, activity intolerance, impaired balance, impaired physical strength and pain with function  Other impairment: DASH=92/150.  Dynamic Gait Index=13/24  Assessment details: 49 year old male with a history of MS. Due to a fall on November 19, 2019 (related to his MS), he dislocated and fractured left shoulder. He has since developed significant left shoulder adhesive capsulitis that is affecting his daily function.  He demonstrates poor balance and abnormal gait due to MS, posture also moves his center of balance which also affects his balance and gait.  Prognosis: fair  Prognosis details: Has transportation issues with getting to rehab on consistent basis  Functional Limitations: carrying objects, lifting, walking, pulling, pushing, uncomfortable because of pain and reaching overhead  Goals  Plan Goals: STG to be met 4 weeks.  1.  PROM of left shoulder flexion/abduction to 160 degrees.  2.  Pt demonstrates a more upright posture to change center of balance to reduce fall risk.  3.  Hip stabilizers improve to 5/5 to reduce fall risk.  4.  UE function improves so that DASH score improves to 75/150.  LTG to be met in 12 weeks.  1.  Pt left shoulder girdle strength is 5/5 so that he can lift objects over head.  2.  Reduce fall risk as seen with DGI score improve to 20/24.  3.  UE function improves so that DASH score improves to 45/150.  4.  Pt is independent with all phases of HEP.    Plan  Therapy options: will be seen for skilled physical therapy services  Planned modality interventions: electrical stimulation/[place]ian stimulation, ultrasound, high voltage pulsed current (pain management) and thermotherapy (hydrocollator packs)  Planned therapy interventions: abdominal trunk stabilization, ADL retraining, balance/weight-bearing training, body mechanics training, fine motor coordination training, functional ROM exercises, gait  training, home exercise program, joint mobilization, therapeutic activities, stretching, strengthening, spinal/joint mobilization, soft tissue mobilization, postural training, neuromuscular re-education and manual therapy  Frequency: 2x week  Duration in weeks: 12          Total Treatment:     45   mins    PT SIGNATURE: Salvador Sher, PT   DATE TREATMENT INITIATED: 2/4/2020    Initial Certification  Certification Period: 5/4/2020  I certify that the therapy services are furnished while this patient is under my care.  The services outlined above are required by this patient, and will be reviewed every 90 days.     PHYSICIAN: Kal Benavides MD      DATE:     Please sign and return via fax to [phone].. Thank you, [facility].

## 2020-02-06 ENCOUNTER — TELEPHONE (OUTPATIENT)
Dept: NEUROLOGY | Facility: CLINIC | Age: 50
End: 2020-02-06

## 2020-02-06 ENCOUNTER — CLINICAL SUPPORT (OUTPATIENT)
Dept: INTERNAL MEDICINE | Facility: CLINIC | Age: 50
End: 2020-02-06

## 2020-02-06 DIAGNOSIS — Z23 FLU VACCINE NEED: ICD-10-CM

## 2020-02-06 PROCEDURE — 90674 CCIIV4 VAC NO PRSV 0.5 ML IM: CPT | Performed by: NURSE PRACTITIONER

## 2020-02-06 PROCEDURE — 90471 IMMUNIZATION ADMIN: CPT | Performed by: NURSE PRACTITIONER

## 2020-02-06 NOTE — TELEPHONE ENCOUNTER
----- Message from Man Williamson sent at 2/5/2020  8:13 PM EST -----  Regarding: Non-Urgent Medical Question  Contact: [phone]  Good morning Dr. Benavides    I am wondering if Man should get a flu shot.     Thank you  Lisa Williamson   [phone]

## 2020-02-13 ENCOUNTER — OFFICE VISIT (OUTPATIENT)
Dept: PHYSICAL THERAPY | Facility: CLINIC | Age: 50
End: 2020-02-13

## 2020-02-13 ENCOUNTER — TREATMENT (OUTPATIENT)
Dept: PHYSICAL THERAPY | Facility: CLINIC | Age: 50
End: 2020-02-13

## 2020-02-13 DIAGNOSIS — R41.841 COGNITIVE COMMUNICATION DEFICIT: Primary | ICD-10-CM

## 2020-02-13 DIAGNOSIS — R26.89 BALANCE PROBLEM: Primary | ICD-10-CM

## 2020-02-13 DIAGNOSIS — G35 MULTIPLE SCLEROSIS (HCC): ICD-10-CM

## 2020-02-13 PROCEDURE — 92523 SPEECH SOUND LANG COMPREHEN: CPT | Performed by: SPEECH-LANGUAGE PATHOLOGIST

## 2020-02-13 PROCEDURE — 97110 THERAPEUTIC EXERCISES: CPT | Performed by: PHYSICAL THERAPIST

## 2020-02-13 PROCEDURE — 97164 PT RE-EVAL EST PLAN CARE: CPT | Performed by: PHYSICAL THERAPIST

## 2020-02-13 NOTE — PROGRESS NOTES
Physical Therapy Re-evaluation and Plan of Care      Patient: Man Williamson   : 1970  Diagnosis/ICD-10 Code:  Balance problem [R26.89]  Referring practitioner: Kal Benavides MD  Date of Initial Visit: 2020  Today's Date: 2020  Patient seen for 1 sessions           Subjective Questionnaire: n/a      Subjective Evaluation    History of Present Illness  Mechanism of injury: Pt diagnosed with MS 6 months ago when he began having dizziness.  Pt reports having one fall and hurt L shoulder.  Pt reports no more dizziness and pt reports no issues with balance.  Pt not able to drive currently per MD.  Pt will try and get disability.  Pt enjoys walking and watching tv, word searches.  Pt does not believe that he has any functional impairments besides the L shoulder injury.      Patient Occupation: construction work Pain  No pain reported    Social Support  Lives in: one-story house (5 yeny with B HRs)  Lives with: parents (mother and ex-wife)    Hand dominance: right    Treatments  Current treatment: physical therapy  Patient Goals  Patient goal: get left shoulder working better           Objective       Neurological Testing     Sensation     Ankle/Foot   Left Ankle/Foot   Intact: light touch and proprioception    Right Ankle/Foot   Intact: light touch and proprioception     Strength/Myotome Testing     Left Hip   Planes of Motion   Flexion: 4  Extension: 4  Abduction: 4  Adduction: 4    Right Hip   Planes of Motion   Flexion: 4-  Extension: 4-  Abduction: 4-  Adduction: 4-    Left Knee   Flexion: 4  Prone flexion: 4    Right Knee   Flexion: 4-  Prone flexion: 4-    Left Ankle/Foot   Dorsiflexion: 4-  Plantar flexion: 4-    Right Ankle/Foot   Dorsiflexion: 4  Plantar flexion: 4    Ambulation     Ambulation: Stairs   Ascend stairs: contact guard assist  Pattern: reciprocal  Railings: two rails  Descend stairs: contact guard assist  Pattern: reciprocal  Railings: two rails    Additional Stairs Ambulation  Details  Pt only places the ball of his foot on the edge of the step with ascending.    Observational Gait   Decreased walking speed.   Left arm swing: decreased  Right arm swing: decreased  Base of support: increased    Quality of Movement During Gait     Additional Quality of Movement During Gait Details  Staggering and veering with gait with multi-tasking       PT Neuro         Assessment & Plan     Assessment  Impairments: abnormal coordination, abnormal gait, abnormal or restricted ROM, activity intolerance, impaired balance, impaired physical strength, lacks appropriate home exercise program, pain with function and safety issue  Other impairment: DASH=92/150.  Dynamic Gait Index=13/24  Assessment details: Pt presents with balance and gait impairments but pt is unaware of his deficits.  Pt would benefit from PT services for his balance and shoulder but pt would only like to have PT for his left shoulder.  Pt agreeable to getting balance HEP and was educated that he could schedule a balance treatment as needed in the future.  Pt to be seen by PT for L shoulder only per pt request.  Prognosis: fair  Prognosis details: Has transportation issues with getting to rehab on consistent basis  Functional Limitations: carrying objects, lifting, walking, pulling, pushing, uncomfortable because of pain and reaching overhead  Goals  Plan Goals: STG to be met 4 weeks.  1.  PROM of left shoulder flexion/abduction to 160 degrees.  2.  Pt demonstrates a more upright posture to change center of balance to reduce fall risk.  3.  Hip stabilizers improve to 5/5 to reduce fall risk.  4.  UE function improves so that DASH score improves to 75/150.  LTG to be met in 12 weeks.  1.  Pt left shoulder girdle strength is 5/5 so that he can lift objects over head.  2.  Reduce fall risk as seen with DGI score improve to 20/24.  3.  UE function improves so that DASH score improves to 45/150.  4.  Pt is independent with all phases of  HEP.    Plan  Therapy options: will be seen for skilled physical therapy services  Planned modality interventions: electrical stimulation/[place] stimulation, ultrasound, high voltage pulsed current (pain management) and thermotherapy (hydrocollator packs)  Planned therapy interventions: abdominal trunk stabilization, ADL retraining, balance/weight-bearing training, body mechanics training, fine motor coordination training, functional ROM exercises, gait training, home exercise program, joint mobilization, therapeutic activities, stretching, strengthening, spinal/joint mobilization, soft tissue mobilization, postural training, neuromuscular re-education and manual therapy  Frequency: 2x week  Duration in weeks: 12  Plan details: Pt to benefit from skilled PT services to improve gait, balance, strength, and overall functional mobility. Pt would like to only have PT services for L shoulder impairment secondary to fall and shoulder injury.            Timed:  Manual Therapy:    0     mins  36309;  Therapeutic Exercise:    10     mins  71447;     Neuromuscular Sasha:    0    mins  33483;    Therapeutic Activity:     0     mins  72460;     Gait Trainin     mins  60698;     Ultrasound:     0     mins  02399;    Electrical Stimulation:    0     mins  80912 ( );    Untimed:  Electrical Stimulation:    0     mins  99464 ( );  Mechanical Traction:    0     mins  61066;   Re-evaluation  45 min  17560    Timed Treatment:   0   mins   Total Treatment:     45   mins    PT SIGNATURE: Kalina Jimenes, PT   DATE TREATMENT INITIATED: 2020    Initial Certification  Certification Period: 2020  I certify that the therapy services are furnished while this patient is under my care.  The services outlined above are required by this patient, and will be reviewed every 90 days.     PHYSICIAN: Kal Benavides MD      DATE:     Please sign and return via fax to  .. Thank you, [facility]rapy.

## 2020-02-13 NOTE — PROGRESS NOTES
Outpatient Speech Language Pathology   Adult Speech Language Cognitive Initial Evaluation       Patient Name: Man Williamson  : 1970  MRN: [id number]  Today's Date: 2020        Visit Date: 2020   Patient Active Problem List   Diagnosis   • Marijuana use   • Tobacco abuse   • Bacteriuria   • Tachycardia   • GERD (gastroesophageal reflux disease)   • HTN (hypertension)   • Multiple sclerosis (CMS/HCC)   • Intractable hiccups   • Severe episode of recurrent major depressive disorder, without psychotic features (CMS/HCC)        Past Medical History:   Diagnosis Date   • Kidney stone    • Low back pain    • Multiple lesions on computed tomography of brain and spine    • Pneumonia         Past Surgical History:   Procedure Laterality Date   • TONSILLECTOMY           Visit Dx:    ICD-10-CM ICD-9-CM   1. Cognitive communication deficit R41.841 799.52   2. Multiple sclerosis (CMS/HCC) G35 340           SLP [facility] Evaluation - 20 1500        Communication Assessment/Intervention    Document Type  evaluation   -RB    Total Evaluation Minutes, SLP  40   -RB    Subjective Information  no complaints   -RB    Patient Observations  alert;agree to therapy    resistant   -RB    Patient/Family Observations  mother accompanied him, but stayed in the waiting room   -RB    Patient Effort  good   -RB    Symptoms Noted During/After Treatment  none   -RB       General Information    Patient Profile Reviewed  yes   -RB    Pertinent History Of Current Problem  Pt was diagnosed with multiple sclerosis 6 months ago. He was working in construction. He enjoys drawing, driving, and word searches. He is independent with medicine management, finances, and appointments per pt report. He denies any memory or attention issues. As the evaluation went on he reported more trouble with memory.    -RB    Precautions/Limitations, Vision  WFL with corrective lenses;for purposes of eval   -RB    Precautions/Limitations, Hearing  other (see  comments)    Pt reports hearing diffiuclty from construction work   -RB    Patient Level of Education  11th grade, vocational school    -RB    Prior Level of Function-Communication  WFL   -RB    Plans/Goals Discussed with  patient;agreed upon   -RB    Barriers to Rehab  resistant to information    transportation   -RB    Patient's Goals for Discharge  functional cognition   -RB    Standardized Assessment Used  RBANS   -RB       Pain Assessment    Additional Documentation  Pain Scale: Numbers Pre/Post-Treatment (Group)   -RB       Pain Scale: Numbers Pre/Post-Treatment    Pain Scale: Numbers, Pretreatment  0/10 - no pain   -RB    Pain Scale: Numbers, Post-Treatment  0/10 - no pain   -RB       Comprehension Assessment/Intervention    Comprehension Assessment/Intervention  Auditory Comprehension   -RB       Auditory Comprehension Assessment/Intervention    Auditory Comprehension (Communication)  mild impairment   -RB    Able to Identify Objects/Pictures (Communication)  familiar objects;pictures of common objects;mild impairment   -RB    Answers Questions (Communication)  WFL   -RB    Able to Follow Commands (Communication)  WFL   -RB    Narrative Discourse  WFL   -RB    Successful Auditory Strategies (Communication)  semantic cues;decrease environmental distractions;phonemic cues;repetition   -RB       Expression Assessment/Intervention    Expression Assessment/Intervention  verbal expression   -RB       Verbal Expression Assessment/Intervention    Verbal Expression  WFL   -RB       Oral Motor Structure and Function    Oral Motor Structure and Function  WFL   -RB       Oral Musculature and Cranial Nerve Assessment    Oral Motor General Assessment  WFL   -RB       Motor Speech Assessment/Intervention    Motor Speech Function  WFL   -RB       Cursory Voice Assessment/Intervention    Quality and Resonance (Voice)  WFL   -RB       Cognitive Assessment Intervention- SLP    Cognitive Function (Cognition)  moderate  impairment   -RB    Orientation Status (Cognition)  WFL   -RB    Memory (Cognitive)  moderate impairment;delayed;short-term;immediate;auditory;new learning;mental manipulation   -RB    Attention (Cognitive)  mild impairment;attention to detail;distracting environment;divided;alternating   -RB    Thought Organization (Cognitive)  high level;moderate impairment;mental manipulation   -RB    Reasoning (Cognitive)  mental flexibility;moderate impairment   -RB    Problem Solving (Cognitive)  WFL   -RB    Functional Math (Cognitive)  --    continue assessment   -RB    Executive Function (Cognition)  moderate impairment;complex organization;self-monitoring/correction;planning;judgement;deficit awareness;realistic goal setting;severe impairment    no insight into deficits until probed   -RB    Pragmatics (Communication)  WFL   -RB    Right Hemisphere Function  WFL   -RB    Cognition, Comment  Pt demonstrates lack of insight into all deficits including physical deficits. Would benefit from skilled ST.    -RB       Standardized Tests    Cognitive/Memory Tests  RBANS: Repeatable Battery for the Assessment of Neuropsychological Status   -RB       RBANS- Repeatable Battery for the Assessment of Neuropsychological Status    Immediate Memory Index Score  61   -RB    Immediate Memory Percentile  --    .5  -RB    Immediate Memory Qualitative Description  extremely low   -RB    Visuospatial Index Score  109   -RB    Visuospatial Percentile  73 %   -RB    Visuospatial Qualitative Description  average   -RB    Language Index Score  79   -RB    Language Percentile  8 %   -RB    Language Qualitative Description  borderline   -RB    Attention Index Score  82   -RB    Attention Percentile  12 %   -RB    Attention Qualitative Description  low average   -RB    Delayed Memory Index Score  78   -RB    Delayed Memory Percentile  7 %   -RB    Delayed Memory Qualitative Description  borderline   -RB    Total Index Score  409   -RB    Total  Percentile  6 %   -RB    Total Qualitative Description  borderline   -RB      User Key  (r) = Recorded By, (t) = Taken By, (c) = Cosigned By    Initials Name Provider Type    Nelly Jay SLP Speech and Language Pathologist                         OP SLP Education     Row Name 02/13/20 1500       Education    Barriers to Learning  Resistant to information  -RB    Action Taken to Address Barriers  educated pt at length regarding disease process and rationale for treatment   -RB    Education Provided  Described results of evaluation;Patient expressed understanding of evaluation;Patient participated in establishing goals and treatment plan  -RB    Assessed  Learning needs;Learning motivation;Learning preferences;Learning readiness  -RB    Learning Motivation  Moderate  -RB    Learning Method  Explanation;Demonstration  -RB    Teaching Response  Verbalized understanding;Demonstrated understanding;Reinforcement needed  -RB    Education Comments  educated on plan of care and disease process as well as rationale for treatment   -RB      User Key  (r) = Recorded By, (t) = Taken By, (c) = Cosigned By    Initials Name Effective Dates    RB Nelly Cat SLP 11/05/19 -           SLP OP Goals     Row Name 02/13/20 1500          Goal Type Needed    Goal Type Needed  Memory;Executive Function;Attention/Orientation;Other Adult Goals  -RB        Executive Function Goals    Executive Function LTG's  Patient will be able to use high level cognitive skills to allow patient to return to work  -RB     Patient will be able to use high level cognitive skills to allow patient to return to work  80%:;without cues  -RB     Status: Patient will be able to use high level cognitive skills to allow patient to return to work  New  -RB     Executive Function STG's  Patient will improve executive functioning skills by using self-monitoring strategies during functional tasks;Patient will improve executive functioning skills by using planning  strategies prior to beginning tasks  -RB     Patient will improve executive functioning skills by using planning strategies prior to beginning tasks  80%:;without cues  -RB     Status: Patient will improve executive functioning skills by using planning strategies prior to beginning tasks  New  -RB     Patient will improve executive functioning skills by using self-monitoring strategies during functional tasks  80%:;without cues  -RB     Status: Patient will improve executive functioning skills by using self-monitoring strategies during functional tasks  New  -RB        Memory Goals    Memory STG's  Patient’s memory skills will be enhanced as reported by patient by utilizing internal memory strategies to recall up to 3 pieces of information after a 5- minute delay;Patient will demonstrate improved ability to recall information by listening to paragraph and answering yes/no questions;Patient’s memory skills will be enhanced as reported by patient by using external memory aides  -RB     Patient’s memory skills will be enhanced as reported by patient by utilizing internal memory strategies to recall up to 3 pieces of information after a 5- minute delay  80%:;without cues  -RB     Status: Patient’s memory skills will be enhanced as reported by patient by utilizing internal memory strategies to recall up to 3 pieces of information after a 5- minute delay  New  -RB     Patient’s memory skills will be enhanced as reported by patient by using external memory aides  80%:;without cues  -RB     Status: Patient’s memory skills will be enhanced as reported by patient by using external memory aides  New  -RB     Patient will demonstrate improved ability to recall information by listening to paragraph and answering yes/no questions  80%:;without cues  -RB     Status: Patient will demonstrate improved ability to recall information by listening to paragraph and answering yes/no questions  New  -RB        Attention/Orientation Goals     Attention/Orientation STG's  Patient will improve attention skills by sustaining focus to high-level cognitive tasks in order to complete task;Patient will improve attention skills by sustaining focus in order to actively hold and manipulate information provided (e.g., sequencing auditorily presented number series in ascending or descending order);Patient will improve attention skills by dividing focus and responding simultaneously to multiple tasks or in order to complete task  -RB     Patient will improve attention skills by sustaining focus in order to actively hold and manipulate information provided (e.g., sequencing auditorily presented number series in ascending or descending order)  80%:;without cues  -RB     Status: Patient will improve attention skills by sustaining focus in order to actively hold and manipulate information provided (e.g., sequencing auditorily presented number series in ascending or descending order)  New  -RB     Patient will improve attention skills by dividing focus and responding simultaneously to multiple tasks or in order to complete task  80%:;without cues  -RB     Status: Patient will improve attention skills by dividing focus and responding simultaneously to multiple tasks or in order to complete task  New  -RB     Patient will improve attention skills by sustaining focus to high-level cognitive tasks in order to complete task  80%:;without cues  -RB     Status: Patient will improve attention skills by sustaining focus to high-level cognitive tasks in order to complete task  New  -RB        Other Goals    Other Adult Goal- 1  Pt will continue further assessment as warranted  -RB     Status: Other Adult Goal- 1  New  -RB        SLP Time Calculation    SLP Goal Re-Cert Due Date  05/12/20  -RB       User Key  (r) = Recorded By, (t) = Taken By, (c) = Cosigned By    Initials Name Provider Type    Nelly Jay, SLP Speech and Language Pathologist          OP SLP Assessment/Plan  - 02/13/20 1500        SLP Assessment    Functional Problems  Speech Language- Adult/Cognition   -RB    Impact on Function: Adult Speech Language/Cognition  Poor attention to task;Trouble learning or remembering new information;Lack of insight or awareness of deficits, safety issues;Unrealistic view of abilities/deficits;Unable to complete specified job requirements   -RB    Clinical Impression: Speech Language-Adult/Congnition  Moderate:;Cognitive Communication Impairment   -RB    Functional Problems Comment  Pt demonstrates lack of insight into deficits which is a safety concern   -RB    Clinical Impression Comments  Would benefit from skilled treatment    -RB    Please refer to paper survey for additional self-reported information  Yes   -RB    Please refer to items scanned into chart for additional diagnostic informaiton and handouts as provided by clinician  Yes   -RB    SLP Diagnosis  moderate cognitive communication deficit    -RB    Prognosis  Good (comment)   -RB    Patient/caregiver participated in establishment of treatment plan and goals  Yes   -RB    Patient would benefit from skilled therapy intervention  Yes   -RB       SLP Plan    Frequency  1x/weekly   -RB    Duration  12 visits    -RB    Planned CPT's?  SLP INDIVIDUAL SPEECH THERAPY: 65831;SLP SPEECH & LANGUAGE EVAL: 50034   -RB    Expected Duration Therapy Session - minutes  30-45 minutes   -RB    Plan Comments  initiate plan of care   -RB      User Key  (r) = Recorded By, (t) = Taken By, (c) = Cosigned By    Initials Name Provider Type    Nelly Jay, SLP Speech and Language Pathologist             SLP Outcome Measures (last 72 hours)      SLP Outcome Measures     Row Name 02/13/20 1500             SLP Outcome Measures    Outcome Measure Used?  Adult NOMS  -RB         Adult FCM Scores    FCM Chosen  Memory  -RB      Memory FCM Score  4  -RB        User Key  (r) = Recorded By, (t) = Taken By, (c) = Cosigned By    Initials Name Effective  Dates    RB Nelly Cat SLP 11/05/19 -              Time Calculation:                     STEFANO Denson  2/13/2020

## 2020-02-14 DIAGNOSIS — M25.512 ACUTE PAIN OF LEFT SHOULDER: Primary | ICD-10-CM

## 2020-02-20 ENCOUNTER — TREATMENT (OUTPATIENT)
Dept: PHYSICAL THERAPY | Facility: CLINIC | Age: 50
End: 2020-02-20

## 2020-02-20 ENCOUNTER — OFFICE VISIT (OUTPATIENT)
Dept: PHYSICAL THERAPY | Facility: CLINIC | Age: 50
End: 2020-02-20

## 2020-02-20 DIAGNOSIS — R41.841 COGNITIVE COMMUNICATION DEFICIT: Primary | ICD-10-CM

## 2020-02-20 DIAGNOSIS — M75.02 ADHESIVE CAPSULITIS OF LEFT SHOULDER: ICD-10-CM

## 2020-02-20 DIAGNOSIS — G35 MULTIPLE SCLEROSIS (HCC): ICD-10-CM

## 2020-02-20 DIAGNOSIS — M25.512 LEFT SHOULDER PAIN, UNSPECIFIED CHRONICITY: Primary | ICD-10-CM

## 2020-02-20 PROCEDURE — 97110 THERAPEUTIC EXERCISES: CPT | Performed by: PHYSICAL THERAPIST

## 2020-02-20 PROCEDURE — 92507 TX SP LANG VOICE COMM INDIV: CPT | Performed by: SPEECH-LANGUAGE PATHOLOGIST

## 2020-02-20 NOTE — PROGRESS NOTES
Re-Assessment / Re-Certification      Patient: Man Williamson   : 1970  Diagnosis/ICD-10 Code:  No primary diagnosis found.  Referring practitioner: Kal Benavides MD  Date of Initial Visit: No linked episodes  Today's Date: 2020  Patient seen for Visit count could not be calculated. Make sure you are using a visit which is associated with an episode. sessions      Subjective:       Clinical Progress: unchanged  Home Program Compliance: No  Treatment has included: therapeutic exercise    Subjective Evaluation    History of Present Illness  Mechanism of injury: The dislocated his L GH jt inferiorly and anteriorly on 19 and had it reduced in the ED. The pt stated that he was given a sling for 4 weeks but reported he could not use his L UE with it on so he used it sparingly. The pt stated that he saw a PT a couple of months ago and was given wall slides and table slides for an HEP but admitted non-compliance. He reported pain with elevation and tucking his shirt in behind his back and acknowledged a large decrease in shoulder ROM. The pt has MS and sustained this injury as a result of a fall caused by LOB. The pt does not feel he is unbalanced and does not want to be treated for balance. He was a poor historian and was confused about his VIRAJ and treatment to date.    Pain  Current pain ratin  At best pain ratin  At worst pain ratin  Location: Anterior L shoulder  Quality: sharp and dull ache    Diagnostic Tests  X-ray: abnormal (L Wayne-Sachs lesion; appropriately positioned GH jt in fossa)    Treatments  Previous treatment: physical therapy  Patient Goals  Patient goals for therapy: decreased pain, increased motion, increased strength, independence with ADLs/IADLs and return to sport/leisure activities         Objective       Static Posture     Comments  L scapular protraction with mild winging; mm atrophy of the L RC.       Palpation     Additional Palpation Details  TTP near the L  delt insertion    Tenderness     Additional Tenderness Details  TTP in the L HH most notably anteriorly and posteriorly; TTP in the L anterior rim of the glenoid fossa      Active Range of Motion   Left Shoulder   Flexion: 105 degrees   Abduction: 95 degrees   External rotation 0°: 45 degrees   Internal rotation BTB: sacrum     Right Shoulder   Flexion: 142 degrees   Abduction: 165 degrees   External rotation 0°: 70 degrees   Internal rotation BTB: T10     Passive Range of Motion   Left Shoulder   Flexion: 120 degrees   Abduction: 110 degrees     Strength/Myotome Testing     Left Shoulder     Planes of Motion   Flexion: 3+   Abduction: 4   External rotation at 0°: 3+   Internal rotation at 0°: 4     Right Shoulder     Planes of Motion   Flexion: 5   Abduction: 5   External rotation at 0°: 4   Internal rotation at 0°: 5      Assessment & Plan     Assessment  Impairments: abnormal muscle firing, abnormal or restricted ROM, impaired physical strength, lacks appropriate home exercise program and pain with function  Assessment details: The pt was evaluated for shoulder pain and ROM deficits on 2/4/20 at a different [facility] location but is receiving other therapies and this location so he transitioned his care to our clinic. He was reassessed today and was provided an HEP for AROM and stretching. I reviewed the wall slides with him from his old HEP but his HH depression and scapular mobility were poor so they were transitioned to table slides. Balance limited his ability to perform these safely so they were modified to sitting. He had some difficulty following instructions and required close attention and manual and verbal cues. Stretches for the shoulder were prescribed to dec capsular and mm tightness but he struggled with consistency and maintenance of the stretch. The pt's cognitive deficits and false confidence may impact his recovery although I feel he will see improvements in motion and function with PT.    Prognosis:  good  Functional Limitations: carrying objects, lifting, reaching behind back, reaching overhead and unable to perform repetitive tasks  Goals  Plan Goals: Short Term Goals (4 weeks):     1. The patient will be independent and compliant with initial HEP.     2. The patient will report pain at rest 0/10 or less and worst pain 4/10 or less.    3. The patient will display decreased TTP in the HH and dec mm tension in the surrounding musculature.    4.  L shoulder AROM will improve to flex 125 deg, abd 125 deg, ER 60 deg, IR T12 deg.    5. The patient will demonstrate inc strength evidenced by MMT as follows: flex 4/5, abd 4+/5, ER 4/5, and IR 4+/5.    6. Quick DASH will improve by 11 points or more.         Long Term Goals (8 weeks):     1. The patient will be appropriate for independent management and compliant with progressed HEP.     2. The patient will report pain at rest 0/10 or less and worst pain 2/10 or less.    3. L shoulder AROM will be within 5 degrees of the contralateral side in flex, abd, ER, and IR.    4. The patient will return to work duties and/or ADLs with no limitations due to shoulder pain or dysfunction.    5. The patient will return to recreational and community activities with no limitations due to shoulder pain or dysfunction.      Plan  Therapy options: will be seen for skilled physical therapy services  Planned modality interventions: cryotherapy, iontophoresis, TENS, electrical stimulation/Russian stimulation and thermotherapy (hydrocollator packs)  Planned therapy interventions: ADL retraining, body mechanics training, flexibility, functional ROM exercises, home exercise program, joint mobilization, manual therapy, neuromuscular re-education, postural training, soft tissue mobilization, strengthening, therapeutic activities and stretching  Frequency: 1x week  Duration in visits: 8  Duration in weeks: 8  Treatment plan discussed with: patient  Plan details: The patient will likely benefit  from TE/TA/NMED to improve RC strength, UE proprioception, and scapular mobility. MT will be utilized in addition to stretching for improved GH jt mobility and AROM. Modalities will be used as needed for pain modulation and reduction of swelling.         Visit Diagnoses:  No diagnosis found.        PT Signature: Manoj Jones PT      Based upon review of the patient's progress and continued therapy plan, it is my medical opinion that Man Williamson should continue physical [facility]  [address] [facility] [address] [phone].    Signature: __________________________________  Kal Benavides MD    Timed:  Manual Therapy:    5     mins  23165;  Therapeutic Exercise:    38     mins  93662;     Neuromuscular Sasha:    0    mins  13289;    Therapeutic Activity:     0     mins  49388;     Gait Trainin     mins  71661;     Ultrasound:     0     mins  76133;    Electrical Stimulation:    0     mins  92436 ( );    Untimed:  Electrical Stimulation:    0     mins  39969 ( );  Mechanical Traction:    0     mins  19111;     Timed Treatment:   43   mins   Total Treatment:     43   mins

## 2020-02-20 NOTE — PROGRESS NOTES
"Outpatient Speech Language Pathology   Adult Speech Language Cognitive Treatment Note       Patient Name: Man Williamson  : 1970  MRN: [id number]  Today's Date: 2020         Visit Date: 2020   Patient Active Problem List   Diagnosis   • Marijuana use   • Tobacco abuse   • Bacteriuria   • Tachycardia   • GERD (gastroesophageal reflux disease)   • HTN (hypertension)   • Multiple sclerosis (CMS/HCC)   • Intractable hiccups   • Severe episode of recurrent major depressive disorder, without psychotic features (CMS/HCC)          Visit Dx:    ICD-10-CM ICD-9-CM   1. Cognitive communication deficit R41.841 799.52   2. Multiple sclerosis (CMS/HCC) G35 340             SLP OP Goals     Row Name 20 1000          Goal Type Needed    Goal Type Needed  Executive Function;Memory;Attention/Orientation;Other Adult Goals  -RB        Subjective Comments    Subjective Comments  Pt appeared disgruntled today during the session. Pt reports feeling \"good\" and no new concerns.   -RB        Subjective Pain    Able to rate subjective pain?  yes  -RB     Pre-Treatment Pain Level  2  -RB     Post-Treatment Pain Level  2  -RB     Subjective Pain Comment  shoulder pain   -RB        Executive Function Goals    Executive Function LTG's  Patient will be able to use high level cognitive skills to allow patient to return to work  -RB     Patient will be able to use high level cognitive skills to allow patient to return to work  80%:;without cues  -RB     Status: Patient will be able to use high level cognitive skills to allow patient to return to work  Progressing as expected  -RB     Comments: Patient will be able to use high level cognitive skills to allow patient to return to work  : initiated and introduced goals   -RB     Executive Function STG's  Patient will improve executive functioning skills by using self-monitoring strategies during functional tasks;Patient will improve executive functioning skills by using " planning strategies prior to beginning tasks  -RB     Patient will improve executive functioning skills by using planning strategies prior to beginning tasks  80%:;without cues  -RB     Status: Patient will improve executive functioning skills by using planning strategies prior to beginning tasks  Progressing as expected  -RB     Comments: Patient will improve executive functioning skills by using planning strategies prior to beginning tasks  2/20: Introduced planning strategies today. mod-max cues needed. Overall lack of awareness of deficits  -RB     Patient will improve executive functioning skills by using self-monitoring strategies during functional tasks  80%:;without cues  -RB     Status: Patient will improve executive functioning skills by using self-monitoring strategies during functional tasks  Progressing as expected  -RB     Comments: Patient will improve executive functioning skills by using self-monitoring strategies during functional tasks  2/20: Introduced today. OVerall lack of awarenss for self monitoring   -RB        Memory Goals    Memory STG's  Patient’s memory skills will be enhanced as reported by patient by utilizing internal memory strategies to recall up to 3 pieces of information after a 5- minute delay;Patient will demonstrate improved ability to recall information by listening to paragraph and answering yes/no questions;Patient’s memory skills will be enhanced as reported by patient by using external memory aides  -RB     Patient’s memory skills will be enhanced as reported by patient by utilizing internal memory strategies to recall up to 3 pieces of information after a 5- minute delay  80%:;without cues  -RB     Status: Patient’s memory skills will be enhanced as reported by patient by utilizing internal memory strategies to recall up to 3 pieces of information after a 5- minute delay  Progressing as expected  -RB     Comments: Patient’s memory skills will be enhanced as reported by  patient by utilizing internal memory strategies to recall up to 3 pieces of information after a 5- minute delay  2/20: 100% today with 4 minute delay  -RB     Patient’s memory skills will be enhanced as reported by patient by using external memory aides  80%:;without cues  -RB     Status: Patient’s memory skills will be enhanced as reported by patient by using external memory aides  Progressing as expected  -RB     Comments: Patient’s memory skills will be enhanced as reported by patient by using external memory aides  2/20: Practiced utilizing a calendar and schedule. mod-max cues needed  -RB     Patient will demonstrate improved ability to recall information by listening to paragraph and answering yes/no questions  80%:;without cues  -RB     Status: Patient will demonstrate improved ability to recall information by listening to paragraph and answering yes/no questions  Progressing as expected  -RB     Comments: Patient will demonstrate improved ability to recall information by listening to paragraph and answering yes/no questions  2/20: ongoing   -RB        Attention/Orientation Goals    Attention/Orientation STG's  Patient will improve attention skills by sustaining focus to high-level cognitive tasks in order to complete task;Patient will improve attention skills by sustaining focus in order to actively hold and manipulate information provided (e.g., sequencing auditorily presented number series in ascending or descending order);Patient will improve attention skills by dividing focus and responding simultaneously to multiple tasks or in order to complete task  -RB     Patient will improve attention skills by sustaining focus in order to actively hold and manipulate information provided (e.g., sequencing auditorily presented number series in ascending or descending order)  80%:;without cues  -RB     Status: Patient will improve attention skills by sustaining focus in order to actively hold and manipulate  information provided (e.g., sequencing auditorily presented number series in ascending or descending order)  Progressing as expected  -RB     Comments: Patient will improve attention skills by sustaining focus in order to actively hold and manipulate information provided (e.g., sequencing auditorily presented number series in ascending or descending order)  2/20: ongoing   -RB     Patient will improve attention skills by dividing focus and responding simultaneously to multiple tasks or in order to complete task  80%:;without cues  -RB     Status: Patient will improve attention skills by dividing focus and responding simultaneously to multiple tasks or in order to complete task  Progressing as expected  -RB     Comments: Patient will improve attention skills by dividing focus and responding simultaneously to multiple tasks or in order to complete task  2/20: 70% today with no cues  -RB     Patient will improve attention skills by sustaining focus to high-level cognitive tasks in order to complete task  80%:;without cues  -RB     Status: Patient will improve attention skills by sustaining focus to high-level cognitive tasks in order to complete task  Progressing as expected  -RB     Comments: Patient will improve attention skills by sustaining focus to high-level cognitive tasks in order to complete task  2/20: 70% today with no cues  -RB        Other Goals    Other Adult Goal- 1  Pt will continue further assessment as warranted  -RB     Status: Other Adult Goal- 1  Progressing as expected  -RB     Comments: Other Adult Goal- 1  2/20: Assessed medicine management. Noted he is not taking pills as directed. needed extra time for task. Not all medicines were managed properly. will continue to assess  -RB        SLP Time Calculation    SLP Goal Re-Cert Due Date  05/12/20  -RB       User Key  (r) = Recorded By, (t) = Taken By, (c) = Cosigned By    Initials Name Provider Type    Nelly Jay SLP Speech and Language  Pathologist          OP SLP Education     Row Name 02/20/20 1100       Education    Barriers to Learning  Resistant to information  -RB    Action Taken to Address Barriers  extensive education on rationale and disease (MS)  -RB    Education Provided  Described results of evaluation;Patient expressed understanding of evaluation;Patient demonstrated recommended strategies;Patient requires further education on strategies, risks  -RB    Assessed  Learning needs;Learning motivation;Learning preferences;Learning readiness  -RB    Learning Motivation  Moderate  -RB    Learning Method  Explanation;Demonstration  -RB    Teaching Response  Verbalized understanding;Demonstrated understanding;Reinforcement needed  -RB    Education Comments  educated on evaluation results, disease progression   -RB      User Key  (r) = Recorded By, (t) = Taken By, (c) = Cosigned By    Initials Name Effective Dates    RB Nelly Cat SLP 11/05/19 -           OP SLP Assessment/Plan - 02/20/20 1100        SLP Assessment    Functional Problems  Speech Language- Adult/Cognition   -RB    Impact on Function: Adult Speech Language/Cognition  Poor attention to task;Trouble learning or remembering new information;Lack of insight or awareness of deficits, safety issues;Unrealistic view of abilities/deficits   -RB    Clinical Impression: Speech Language-Adult/Congnition  Moderate:;Cognitive Communication Impairment   -RB    Functional Problems Comment  Pt demonstrates severe lack of insight into deficits which is a safety concern   -RB    Clinical Impression Comments  Pt continued to push back on intervention. Working on rapport building in treatment to overcome some of this. Able to utilize some strategies today   -RB    Please refer to paper survey for additional self-reported information  Yes   -RB    Please refer to items scanned into chart for additional diagnostic informaiton and handouts as provided by clinician  Yes   -RB    SLP Diagnosis   moderate cognitive communication deficit    -RB    Prognosis  Good (comment)   -RB    Patient/caregiver participated in establishment of treatment plan and goals  Yes   -RB    Patient would benefit from skilled therapy intervention  Yes   -RB       SLP Plan    Frequency  1x/weekly   -RB    Duration  11 visits    -RB    Planned CPT's?  SLP INDIVIDUAL SPEECH THERAPY: 94341;SLP SPEECH & LANGUAGE EVAL: 85686   -RB    Expected Duration Therapy Session - minutes  30-45 minutes   -RB    Plan Comments  continue plan of care   -RB      User Key  (r) = Recorded By, (t) = Taken By, (c) = Cosigned By    Initials Name Provider Type    RB Nelly Cat, SLP Speech and Language Pathologist                Nelly Cat MA CCC-SLP  2/20/2020

## 2020-03-23 ENCOUNTER — DOCUMENTATION (OUTPATIENT)
Dept: PHYSICAL THERAPY | Facility: CLINIC | Age: 50
End: 2020-03-23

## 2020-03-23 PROCEDURE — STNOCHG PR SPEECH THERAPY NO CHARGE VISIT: Performed by: SPEECH-LANGUAGE PATHOLOGIST

## 2020-03-23 NOTE — PROGRESS NOTES
Discharge Summary  Discharge Summary from Physical Therapy Report      Dates  PT visit: n/a  Number of Visits: 3     Discharge Status of Patient: See MD Note dated n/a    Goals: Not Met    Discharge Plan: Continue with current home exercise program as instructed    Comments Pt did not return and will be discharged from PT services.    Date of Discharge 3/23/2020        Kalina Jimenes, PT  Physical Therapist

## 2020-03-23 NOTE — PROGRESS NOTES
Outpatient Speech Language Pathology   Adult Speech Language Cognitive Progress Note/Discharge Summary       Patient Name: Man Williamson  : 1970  MRN: [id number]  Today's Date: 3/23/2020         Visit Date: 2020   Patient Active Problem List   Diagnosis   • Marijuana use   • Tobacco abuse   • Bacteriuria   • Tachycardia   • GERD (gastroesophageal reflux disease)   • HTN (hypertension)   • Multiple sclerosis (CMS/HCC)   • Intractable hiccups   • Severe episode of recurrent major depressive disorder, without psychotic features (CMS/HCC)          Visit Dx:  No diagnosis found.          SLP OP Goals     Row Name 20 0800          Goal Type Needed    Goal Type Needed  Executive Function;Memory;Attention/Orientation;Other Adult Goals  -RB        Executive Function Goals    Executive Function LTG's  Patient will be able to use high level cognitive skills to allow patient to return to work  -RB     Patient will be able to use high level cognitive skills to allow patient to return to work  80%:;without cues  -RB     Status: Patient will be able to use high level cognitive skills to allow patient to return to work  Discontinued  -RB     Executive Function STG's  Patient will improve executive functioning skills by using self-monitoring strategies during functional tasks;Patient will improve executive functioning skills by using planning strategies prior to beginning tasks  -RB     Patient will improve executive functioning skills by using planning strategies prior to beginning tasks  80%:;without cues  -RB     Status: Patient will improve executive functioning skills by using planning strategies prior to beginning tasks  Discontinued  -RB     Patient will improve executive functioning skills by using self-monitoring strategies during functional tasks  80%:;without cues  -RB     Status: Patient will improve executive functioning skills by using self-monitoring strategies during functional tasks  Discontinued   -RB        Memory Goals    Memory STG's  Patient’s memory skills will be enhanced as reported by patient by utilizing internal memory strategies to recall up to 3 pieces of information after a 5- minute delay;Patient will demonstrate improved ability to recall information by listening to paragraph and answering yes/no questions;Patient’s memory skills will be enhanced as reported by patient by using external memory aides  -RB     Patient’s memory skills will be enhanced as reported by patient by utilizing internal memory strategies to recall up to 3 pieces of information after a 5- minute delay  80%:;without cues  -RB     Status: Patient’s memory skills will be enhanced as reported by patient by utilizing internal memory strategies to recall up to 3 pieces of information after a 5- minute delay  Discontinued  -RB     Patient’s memory skills will be enhanced as reported by patient by using external memory aides  80%:;without cues  -RB     Status: Patient’s memory skills will be enhanced as reported by patient by using external memory aides  Discontinued  -RB     Patient will demonstrate improved ability to recall information by listening to paragraph and answering yes/no questions  80%:;without cues  -RB     Status: Patient will demonstrate improved ability to recall information by listening to paragraph and answering yes/no questions  Discontinued  -RB        Attention/Orientation Goals    Attention/Orientation STG's  Patient will improve attention skills by sustaining focus to high-level cognitive tasks in order to complete task;Patient will improve attention skills by sustaining focus in order to actively hold and manipulate information provided (e.g., sequencing auditorily presented number series in ascending or descending order);Patient will improve attention skills by dividing focus and responding simultaneously to multiple tasks or in order to complete task  -RB     Patient will improve attention skills by  sustaining focus in order to actively hold and manipulate information provided (e.g., sequencing auditorily presented number series in ascending or descending order)  80%:;without cues  -RB     Status: Patient will improve attention skills by sustaining focus in order to actively hold and manipulate information provided (e.g., sequencing auditorily presented number series in ascending or descending order)  Discontinued  -RB     Patient will improve attention skills by dividing focus and responding simultaneously to multiple tasks or in order to complete task  80%:;without cues  -RB     Status: Patient will improve attention skills by dividing focus and responding simultaneously to multiple tasks or in order to complete task  Discontinued  -RB     Patient will improve attention skills by sustaining focus to high-level cognitive tasks in order to complete task  80%:;without cues  -RB     Status: Patient will improve attention skills by sustaining focus to high-level cognitive tasks in order to complete task  Discontinued  -RB        Other Goals    Other Adult Goal- 1  Pt will continue further assessment as warranted  -RB     Status: Other Adult Goal- 1  Discontinued  -RB       User Key  (r) = Recorded By, (t) = Taken By, (c) = Cosigned By    Initials Name Provider Type    Nelly Jay SLP Speech and Language Pathologist              OP SLP Assessment/Plan - 03/23/20 0800        SLP Assessment    Clinical Impression Comments  would benefit from skilled ST    -RB       SLP Plan    Plan Comments  discharge    -RB      User Key  (r) = Recorded By, (t) = Taken By, (c) = Cosigned By    Initials Name Provider Type    Nelly Jay SLP Speech and Language Pathologist             SLP Outcome Measures (last 72 hours)      SLP Outcome Measures     Row Name 03/23/20 0800             SLP Outcome Measures    Outcome Measure Used?  Adult NOMS  -RB         Adult FCM Scores    FCM Chosen  Memory  -RB      Memory FCM Score   4  -RB        User Key  (r) = Recorded By, (t) = Taken By, (c) = Cosigned By    Initials Name Effective Dates    Nelly Jay SLP 02/28/20 -                  OP SLP Discharge Summary  Date of Discharge: 03/23/20  Reason for Discharge: (frequent cancellations )  Progress Toward Achieving Short/long Term Goals: goals partially met within established timelines(only seen 2x )  Discharge Destination: home  Discharge Instructions: come back as needed       Nelly Cat MA CCC-SLP  3/23/2020

## 2020-04-20 ENCOUNTER — TELEMEDICINE (OUTPATIENT)
Dept: NEUROLOGY | Facility: CLINIC | Age: 50
End: 2020-04-20

## 2020-04-20 DIAGNOSIS — R06.6 INTRACTABLE HICCUPS: ICD-10-CM

## 2020-04-20 DIAGNOSIS — F33.2 SEVERE EPISODE OF RECURRENT MAJOR DEPRESSIVE DISORDER, WITHOUT PSYCHOTIC FEATURES (HCC): ICD-10-CM

## 2020-04-20 DIAGNOSIS — G35 MULTIPLE SCLEROSIS (HCC): Primary | ICD-10-CM

## 2020-04-20 PROCEDURE — 99214 OFFICE O/P EST MOD 30 MIN: CPT | Performed by: PSYCHIATRY & NEUROLOGY

## 2020-04-20 RX ORDER — PRAMIPEXOLE DIHYDROCHLORIDE 0.25 MG/1
.25-.5 TABLET ORAL NIGHTLY
Qty: 60 TABLET | Refills: 5 | Status: SHIPPED | OUTPATIENT
Start: 2020-04-20 | End: 2020-08-14

## 2020-04-20 NOTE — PROGRESS NOTES
Subjective   Patient ID: Man Williamson is a 49 y.o. male     Chief Complaint   Patient presents with   • Multiple Sclerosis     Follow up         History of Present Illness    49 y.o. male consents to a video visit returns in follow up for RRMS, intractable hiccups, MDD.  Last visit on 1/17/20 continued Aubagio, Depakote, ordered prednisone taper and labs.     MOG - neg  NMO reordered due to insufficient sample  Hep pnl, TB -  Neg    1/17/20 CBC, CMP - NCS,      RRMS    Sx have stabilized.  80% of normal.      Denies falls and balance has improved.  Can go up and down steps.      Attention and concentration remains decreased.   Slow to recall events.     Left hand with residual numbness.      Intractable hiccups.    Quit Depakote as hiccups stopped.      MDD    Mood is irritable due to self quarantine.      RLS    Wife notes he is jerking during his sleep.  Pt does not want medications for jerking.     Problem history:     Pt admitted 11/8/19 for several day history of dizziness. October 2019 new groin N/T and unsteady gait.      UDS + THC, opiates, BZD  MRI Brain 11/8/19, multiple enhancing white matter lesions.     CSF 11/26/19 8 OCB, IgG syn rate 73.7; protein 48.5    Treated with IVMP for 5 days in 12/2019.  Felt better for 10 days but sx have now returned.      Frequent hiccups, decreased attention and concentration.  Gait is unsteady.  Numbness in groin has resolved.    Four recent falls.  Dislocated shoulder on 11/29/19.     Reports intermittent hiccups.     Past Medical History:   Diagnosis Date   • Kidney stone    • Low back pain    • Multiple lesions on computed tomography of brain and spine    • Pneumonia      Family History   Problem Relation Age of Onset   • Hypertension Mother    • Other Mother    • Coronary artery disease Father      Social History     Socioeconomic History   • Marital status:      Spouse name: Not on file   • Number of children: Not on file   • Years of education: Not  on file   • Highest education level: Not on file   Tobacco Use   • Smoking status: Former Smoker     Types: Cigarettes, Cigars     Last attempt to quit: 11/16/2016     Years since quitting: 3.4   • Smokeless tobacco: Former User     Types: Chew   Substance and Sexual Activity   • Alcohol use: Yes     Comment: rare   • Drug use: Yes     Types: Marijuana, Benzodiazepines     Comment: abnormal urine drug screen   • Sexual activity: Yes     Partners: Female     Birth control/protection: Surgical       Review of Systems   Constitutional: Positive for fatigue. Negative for activity change and unexpected weight change.   HENT: Negative for facial swelling, hearing loss, tinnitus, trouble swallowing and voice change.    Eyes: Negative for photophobia, pain and visual disturbance.   Respiratory: Negative for apnea, cough and choking.    Gastrointestinal: Negative for constipation.   Endocrine: Negative for cold intolerance.   Genitourinary: Negative for difficulty urinating, frequency and urgency.   Musculoskeletal: Positive for gait problem. Negative for arthralgias, myalgias and neck stiffness.   Skin: Negative for rash.   Allergic/Immunologic: Negative for immunocompromised state.   Neurological: Positive for speech difficulty. Negative for tremors, seizures, facial asymmetry and numbness.   Hematological: Negative for adenopathy.   Psychiatric/Behavioral: Positive for agitation, behavioral problems, confusion, decreased concentration and dysphoric mood. Negative for hallucinations and sleep disturbance. The patient is not nervous/anxious.        Objective     Neurologic Exam     Mental Status   Attention: normal. Concentration: normal.   Speech: speech is normal   Level of consciousness: alert  Knowledge: good and consistent with education.   Normal comprehension.     Cranial Nerves     CN III, IV, VI   Extraocular motions are normal.     CN VII   Facial expression full, symmetric.     Motor Exam MAEW       Physical Exam    Constitutional: He appears well-developed and well-nourished.   Eyes: EOM are normal.   Psychiatric: His speech is normal.   Nursing note and vitals reviewed.      Lab on 01/17/2020   Component Date Value Ref Range Status   • Glucose 01/17/2020 91  65 - 99 mg/dL Final   • BUN 01/17/2020 11  6 - 20 mg/dL Final   • Creatinine 01/17/2020 1.06  0.76 - 1.27 mg/dL Final   • Sodium 01/17/2020 138  136 - 145 mmol/L Final   • Potassium 01/17/2020 4.7  3.5 - 5.2 mmol/L Final   • Chloride 01/17/2020 95* 98 - 107 mmol/L Final   • CO2 01/17/2020 29.1* 22.0 - 29.0 mmol/L Final   • Calcium 01/17/2020 9.9  8.6 - 10.5 mg/dL Final   • Total Protein 01/17/2020 7.3  6.0 - 8.5 g/dL Final   • Albumin 01/17/2020 4.00  3.50 - 5.20 g/dL Final   • ALT (SGPT) 01/17/2020 6  1 - 41 U/L Final   • AST (SGOT) 01/17/2020 10  1 - 40 U/L Final   • Alkaline Phosphatase 01/17/2020 63  39 - 117 U/L Final   • Total Bilirubin 01/17/2020 0.3  0.2 - 1.2 mg/dL Final   • eGFR Non African Amer 01/17/2020 74  >60 mL/min/1.73 Final   • Globulin 01/17/2020 3.3  gm/dL Final   • A/G Ratio 01/17/2020 1.2  g/dL Final   • BUN/Creatinine Ratio 01/17/2020 10.4  7.0 - 25.0 Final   • Anion Gap 01/17/2020 13.9  5.0 - 15.0 mmol/L Final   • Valproic Acid 01/17/2020 104.0  50.0 - 125.0 mcg/mL Final   • WBC 01/17/2020 9.50  3.40 - 10.80 10*3/mm3 Final   • RBC 01/17/2020 5.54  4.14 - 5.80 10*6/mm3 Final   • Hemoglobin 01/17/2020 16.5  13.0 - 17.7 g/dL Final   • Hematocrit 01/17/2020 47.8  37.5 - 51.0 % Final   • MCV 01/17/2020 86.3  79.0 - 97.0 fL Final   • MCH 01/17/2020 29.8  26.6 - 33.0 pg Final   • MCHC 01/17/2020 34.5  31.5 - 35.7 g/dL Final   • RDW 01/17/2020 13.0  12.3 - 15.4 % Final   • RDW-SD 01/17/2020 40.3  37.0 - 54.0 fl Final   • MPV 01/17/2020 13.0* 6.0 - 12.0 fL Final   • Platelets 01/17/2020 218  140 - 450 10*3/mm3 Final   • Neutrophil % 01/17/2020 63.2  42.7 - 76.0 % Final   • Lymphocyte % 01/17/2020 24.9  19.6 - 45.3 % Final   • Monocyte % 01/17/2020 9.3   5.0 - 12.0 % Final   • Eosinophil % 01/17/2020 1.6  0.3 - 6.2 % Final   • Basophil % 01/17/2020 0.5  0.0 - 1.5 % Final   • Immature Grans % 01/17/2020 0.5  0.0 - 0.5 % Final   • Neutrophils, Absolute 01/17/2020 6.00  1.70 - 7.00 10*3/mm3 Final   • Lymphocytes, Absolute 01/17/2020 2.37  0.70 - 3.10 10*3/mm3 Final   • Monocytes, Absolute 01/17/2020 0.88  0.10 - 0.90 10*3/mm3 Final   • Eosinophils, Absolute 01/17/2020 0.15  0.00 - 0.40 10*3/mm3 Final   • Basophils, Absolute 01/17/2020 0.05  0.00 - 0.20 10*3/mm3 Final   • Immature Grans, Absolute 01/17/2020 0.05  0.00 - 0.05 10*3/mm3 Final   • nRBC 01/17/2020 0.0  0.0 - 0.2 /100 WBC Final         Assessment/Plan     Problem List Items Addressed This Visit        Respiratory    Intractable hiccups    Current Assessment & Plan     Sx improved off Depakote             Nervous and Auditory    Multiple sclerosis (CMS/HCC) - Primary    Current Assessment & Plan     Sx improved on Aubagio     CBC,CMP         Relevant Medications    methylPREDNISolone sodium succinate (SOLU-Medrol) 1,000 mg in sodium chloride 0.9 % 100 mL IVPB (Completed)    methylPREDNISolone sodium succinate (SOLU-Medrol) 1,000 mg in sodium chloride 0.9 % 100 mL IVPB (Completed)    methylPREDNISolone sodium succinate (SOLU-Medrol) 1,000 mg in sodium chloride 0.9 % 100 mL IVPB (Completed)    methylPREDNISolone sodium succinate (SOLU-Medrol) 1,000 mg in sodium chloride 0.9 % 100 mL IVPB (Completed)    methylPREDNISolone sodium succinate (SOLU-Medrol) 1,000 mg in sodium chloride 0.9 % 100 mL IVPB (Completed)    Other Relevant Orders    CBC & Differential    Comprehensive Metabolic Panel       Other    Severe episode of recurrent major depressive disorder, without psychotic features (CMS/HCC)    Current Assessment & Plan     Psychological condition is unchanged.  Continue current treatment regimen.  Psychological condition  will be reassessed in 3 months.         Relevant Medications    Teriflunomide (Aubagio) 14  MG tablet        Total time:  20 minutes     No follow-ups on file.

## 2020-04-24 ENCOUNTER — LAB (OUTPATIENT)
Dept: LAB | Facility: HOSPITAL | Age: 50
End: 2020-04-24

## 2020-04-24 DIAGNOSIS — G35 MULTIPLE SCLEROSIS (HCC): ICD-10-CM

## 2020-04-24 LAB
ALBUMIN SERPL-MCNC: 4 G/DL (ref 3.5–5.2)
ALBUMIN/GLOB SERPL: 1.3 G/DL
ALP SERPL-CCNC: 53 U/L (ref 39–117)
ALT SERPL W P-5'-P-CCNC: 17 U/L (ref 1–41)
ANION GAP SERPL CALCULATED.3IONS-SCNC: 9.4 MMOL/L (ref 5–15)
AST SERPL-CCNC: 10 U/L (ref 1–40)
BASOPHILS # BLD AUTO: 0.03 10*3/MM3 (ref 0–0.2)
BASOPHILS NFR BLD AUTO: 0.4 % (ref 0–1.5)
BILIRUB SERPL-MCNC: 0.3 MG/DL (ref 0.2–1.2)
BUN BLD-MCNC: 11 MG/DL (ref 6–20)
BUN/CREAT SERPL: 10.5 (ref 7–25)
CALCIUM SPEC-SCNC: 9.4 MG/DL (ref 8.6–10.5)
CHLORIDE SERPL-SCNC: 98 MMOL/L (ref 98–107)
CO2 SERPL-SCNC: 29.6 MMOL/L (ref 22–29)
CREAT BLD-MCNC: 1.05 MG/DL (ref 0.76–1.27)
DEPRECATED RDW RBC AUTO: 38.3 FL (ref 37–54)
EOSINOPHIL # BLD AUTO: 0.09 10*3/MM3 (ref 0–0.4)
EOSINOPHIL NFR BLD AUTO: 1.1 % (ref 0.3–6.2)
ERYTHROCYTE [DISTWIDTH] IN BLOOD BY AUTOMATED COUNT: 11.8 % (ref 12.3–15.4)
GFR SERPL CREATININE-BSD FRML MDRD: 74 ML/MIN/1.73
GLOBULIN UR ELPH-MCNC: 3.1 GM/DL
GLUCOSE BLD-MCNC: 96 MG/DL (ref 65–99)
HCT VFR BLD AUTO: 45.5 % (ref 37.5–51)
HGB BLD-MCNC: 14.8 G/DL (ref 13–17.7)
IMM GRANULOCYTES # BLD AUTO: 0.02 10*3/MM3 (ref 0–0.05)
IMM GRANULOCYTES NFR BLD AUTO: 0.2 % (ref 0–0.5)
LYMPHOCYTES # BLD AUTO: 2.36 10*3/MM3 (ref 0.7–3.1)
LYMPHOCYTES NFR BLD AUTO: 28.5 % (ref 19.6–45.3)
MCH RBC QN AUTO: 28.8 PG (ref 26.6–33)
MCHC RBC AUTO-ENTMCNC: 32.5 G/DL (ref 31.5–35.7)
MCV RBC AUTO: 88.5 FL (ref 79–97)
MONOCYTES # BLD AUTO: 0.94 10*3/MM3 (ref 0.1–0.9)
MONOCYTES NFR BLD AUTO: 11.4 % (ref 5–12)
NEUTROPHILS # BLD AUTO: 4.83 10*3/MM3 (ref 1.7–7)
NEUTROPHILS NFR BLD AUTO: 58.4 % (ref 42.7–76)
NRBC BLD AUTO-RTO: 0 /100 WBC (ref 0–0.2)
PLATELET # BLD AUTO: 266 10*3/MM3 (ref 140–450)
PMV BLD AUTO: 12.4 FL (ref 6–12)
POTASSIUM BLD-SCNC: 4.7 MMOL/L (ref 3.5–5.2)
PROT SERPL-MCNC: 7.1 G/DL (ref 6–8.5)
RBC # BLD AUTO: 5.14 10*6/MM3 (ref 4.14–5.8)
SODIUM BLD-SCNC: 137 MMOL/L (ref 136–145)
WBC NRBC COR # BLD: 8.27 10*3/MM3 (ref 3.4–10.8)

## 2020-04-24 PROCEDURE — 85025 COMPLETE CBC W/AUTO DIFF WBC: CPT | Performed by: PSYCHIATRY & NEUROLOGY

## 2020-04-24 PROCEDURE — 36415 COLL VENOUS BLD VENIPUNCTURE: CPT

## 2020-04-24 PROCEDURE — 80053 COMPREHEN METABOLIC PANEL: CPT | Performed by: PSYCHIATRY & NEUROLOGY

## 2020-06-02 ENCOUNTER — TELEPHONE (OUTPATIENT)
Dept: NEUROLOGY | Facility: CLINIC | Age: 50
End: 2020-06-02

## 2020-06-02 NOTE — TELEPHONE ENCOUNTER
PT CALLED STATING HE NEEDS TO SPEAK TO DR. GALDAMEZ. HE WOULD NOT GIVE ANY INFORMATION OTHER THAN STATING HE WANTS TO BE SEEN SOONER THAN HIS APPT IN AUGUST BUT HE DID STATE HE WAS NOT HAVING ANY ISSUES. HE REPEATED THAT HE JUST NEEDED TO SPEAK TO DR. GALDAMEZ. PLEASE ADVISE      BEST CALL BACK- [phone]

## 2020-07-20 ENCOUNTER — TELEPHONE (OUTPATIENT)
Dept: NEUROLOGY | Facility: CLINIC | Age: 50
End: 2020-07-20

## 2020-07-20 RX ORDER — SILDENAFIL 100 MG/1
100 TABLET, FILM COATED ORAL DAILY PRN
Qty: 10 TABLET | Refills: 5 | Status: SHIPPED | OUTPATIENT
Start: 2020-07-20 | End: 2020-08-14

## 2020-07-20 NOTE — TELEPHONE ENCOUNTER
PT CALLED IN REQUESTING TO SPEAK TO DR. GALDAMEZ. HE DID NOT PROVIDE MUCH DETAIL AND STATED HE NEEDS TO SPEAK TO HIM DIRECTLY        CALL BACK- [phone]

## 2020-08-14 ENCOUNTER — OFFICE VISIT (OUTPATIENT)
Dept: NEUROLOGY | Facility: CLINIC | Age: 50
End: 2020-08-14

## 2020-08-14 ENCOUNTER — LAB (OUTPATIENT)
Dept: LAB | Facility: HOSPITAL | Age: 50
End: 2020-08-14

## 2020-08-14 VITALS
SYSTOLIC BLOOD PRESSURE: 128 MMHG | TEMPERATURE: 98.2 F | OXYGEN SATURATION: 98 % | BODY MASS INDEX: 25.8 KG/M2 | WEIGHT: 201 LBS | HEIGHT: 74 IN | DIASTOLIC BLOOD PRESSURE: 81 MMHG | HEART RATE: 96 BPM

## 2020-08-14 DIAGNOSIS — G35 MULTIPLE SCLEROSIS (HCC): Primary | ICD-10-CM

## 2020-08-14 DIAGNOSIS — G35 MULTIPLE SCLEROSIS (HCC): ICD-10-CM

## 2020-08-14 DIAGNOSIS — R06.6 INTRACTABLE HICCUPS: ICD-10-CM

## 2020-08-14 LAB
BASOPHILS # BLD AUTO: 0.05 10*3/MM3 (ref 0–0.2)
BASOPHILS NFR BLD AUTO: 0.6 % (ref 0–1.5)
DEPRECATED RDW RBC AUTO: 40.8 FL (ref 37–54)
EOSINOPHIL # BLD AUTO: 0.1 10*3/MM3 (ref 0–0.4)
EOSINOPHIL NFR BLD AUTO: 1.1 % (ref 0.3–6.2)
ERYTHROCYTE [DISTWIDTH] IN BLOOD BY AUTOMATED COUNT: 13 % (ref 12.3–15.4)
HAV IGM SERPL QL IA: NORMAL
HBV CORE IGM SERPL QL IA: NORMAL
HBV SURFACE AG SERPL QL IA: NORMAL
HCT VFR BLD AUTO: 44.8 % (ref 37.5–51)
HCV AB SER DONR QL: NORMAL
HGB BLD-MCNC: 14.8 G/DL (ref 13–17.7)
IMM GRANULOCYTES # BLD AUTO: 0.03 10*3/MM3 (ref 0–0.05)
IMM GRANULOCYTES NFR BLD AUTO: 0.3 % (ref 0–0.5)
LYMPHOCYTES # BLD AUTO: 2.06 10*3/MM3 (ref 0.7–3.1)
LYMPHOCYTES NFR BLD AUTO: 22.9 % (ref 19.6–45.3)
MCH RBC QN AUTO: 28.7 PG (ref 26.6–33)
MCHC RBC AUTO-ENTMCNC: 33 G/DL (ref 31.5–35.7)
MCV RBC AUTO: 86.8 FL (ref 79–97)
MONOCYTES # BLD AUTO: 0.85 10*3/MM3 (ref 0.1–0.9)
MONOCYTES NFR BLD AUTO: 9.5 % (ref 5–12)
NEUTROPHILS NFR BLD AUTO: 5.89 10*3/MM3 (ref 1.7–7)
NEUTROPHILS NFR BLD AUTO: 65.6 % (ref 42.7–76)
NRBC BLD AUTO-RTO: 0 /100 WBC (ref 0–0.2)
PLATELET # BLD AUTO: 288 10*3/MM3 (ref 140–450)
PMV BLD AUTO: 12.2 FL (ref 6–12)
RBC # BLD AUTO: 5.16 10*6/MM3 (ref 4.14–5.8)
WBC # BLD AUTO: 8.98 10*3/MM3 (ref 3.4–10.8)

## 2020-08-14 PROCEDURE — 80053 COMPREHEN METABOLIC PANEL: CPT

## 2020-08-14 PROCEDURE — 99214 OFFICE O/P EST MOD 30 MIN: CPT | Performed by: PSYCHIATRY & NEUROLOGY

## 2020-08-14 PROCEDURE — 36415 COLL VENOUS BLD VENIPUNCTURE: CPT

## 2020-08-14 PROCEDURE — 85025 COMPLETE CBC W/AUTO DIFF WBC: CPT

## 2020-08-14 PROCEDURE — 80074 ACUTE HEPATITIS PANEL: CPT

## 2020-08-14 RX ORDER — FAMOTIDINE 10 MG/ML
20 INJECTION, SOLUTION INTRAVENOUS AS NEEDED
Status: CANCELLED | OUTPATIENT
Start: 2020-08-14

## 2020-08-14 RX ORDER — MEPERIDINE HYDROCHLORIDE 50 MG/ML
25 INJECTION INTRAMUSCULAR; INTRAVENOUS; SUBCUTANEOUS
Status: CANCELLED | OUTPATIENT
Start: 2020-08-14

## 2020-08-14 RX ORDER — DIPHENHYDRAMINE HYDROCHLORIDE 50 MG/ML
50 INJECTION INTRAMUSCULAR; INTRAVENOUS AS NEEDED
Status: CANCELLED | OUTPATIENT
Start: 2020-08-14

## 2020-08-14 RX ORDER — IBUPROFEN 400 MG/1
400 TABLET ORAL EVERY 6 HOURS PRN
Status: CANCELLED | OUTPATIENT
Start: 2020-08-14

## 2020-08-14 RX ORDER — DIPHENHYDRAMINE HYDROCHLORIDE 50 MG/ML
25 INJECTION INTRAMUSCULAR; INTRAVENOUS ONCE
Status: CANCELLED | OUTPATIENT
Start: 2020-08-14

## 2020-08-14 RX ORDER — SODIUM CHLORIDE 9 MG/ML
250 INJECTION, SOLUTION INTRAVENOUS ONCE
Status: CANCELLED | OUTPATIENT
Start: 2020-08-14

## 2020-08-14 NOTE — PROGRESS NOTES
Subjective   Patient ID: Man Williamson is a 50 y.o. male     Chief Complaint   Patient presents with   • [facility]        History of Present Illness    50 y.o. male returns in follow up for RRMS, intractable hiccups.  Last visit on 4/20/20 continued Aubagio, ordered labs.     MOG - neg  NMO - neg  Hep pnl, TB -  Neg    4/24/20 CBC,CMP - NCS     MSFC reviewed    SDMT 28, 25FTW 8.1 sec     Balance is unsteady, memory is decreased.  Mood is stable. .      Hiccups stopped after taking Depakote ER 1000 mg qhs prn    Problem history:     Pt admitted 11/8/19 for several day history of dizziness. October 2019 new groin N/T and unsteady gait.      UDS + THC, opiates, BZD  MRI Brain, my review of films, 11/18/19 multiple enhancing white matter lesions.     CSF 11/26/19 8 OCB, IgG syn rate 73.7; protein 48.5    Treated with IVMP for 5 days in 12/2019.  Felt better for 10 days but sx have now returned.      Frequent hiccups, decreased attention and concentration.  Gait is unsteady.  Numbness in groin has resolved.    Four recent falls.  Dislocated shoulder on 11/29/19.     Reports intermittent hiccups.     Past Medical History:   Diagnosis Date   • Kidney stone    • Low back pain    • Multiple lesions on computed tomography of brain and spine    • Pneumonia      Family History   Problem Relation Age of Onset   • Hypertension Mother    • Other Mother    • Coronary artery disease Father      Social History     Socioeconomic History   • Marital status:      Spouse name: Not on file   • Number of children: Not on file   • Years of education: Not on file   • Highest education level: Not on file   Tobacco Use   • Smoking status: Former Smoker     Types: Cigarettes, Cigars     Last attempt to quit: 11/16/2016     Years since quitting: 3.7   • Smokeless tobacco: Former User     Types: Chew   Substance and Sexual Activity   • Alcohol use: Yes     Comment: rare   • Drug use: Yes     Types: Marijuana,  "Benzodiazepines     Comment: abnormal urine drug screen   • Sexual activity: Yes     Partners: Female     Birth control/protection: Surgical       Review of Systems   Constitutional: Positive for fatigue. Negative for activity change and unexpected weight change.   HENT: Negative for facial swelling, hearing loss, tinnitus, trouble swallowing and voice change.    Eyes: Negative for photophobia, pain and visual disturbance.   Respiratory: Negative for apnea, cough and choking.    Gastrointestinal: Negative for constipation.   Endocrine: Negative for cold intolerance.   Genitourinary: Negative for difficulty urinating, frequency and urgency.   Musculoskeletal: Positive for gait problem. Negative for arthralgias, myalgias and neck stiffness.   Skin: Negative for rash.   Allergic/Immunologic: Negative for immunocompromised state.   Neurological: Positive for speech difficulty. Negative for tremors, seizures, facial asymmetry and numbness.   Hematological: Negative for adenopathy.   Psychiatric/Behavioral: Positive for agitation, behavioral problems, confusion, decreased concentration and dysphoric mood. Negative for hallucinations and sleep disturbance. The patient is not nervous/anxious.        Objective     Vitals:    08/14/20 1041   BP: 128/81   Pulse: 96   Temp: 98.2 °F (36.8 °C)   SpO2: 98%   Weight: 91.2 kg (201 lb)   Height: 188 cm (74.02\")       Neurologic Exam     Mental Status   Registration: recalls 3 of 3 objects. Recall at 5 minutes: recalls 3 of 3 objects. Follows 3 step commands.   Attention: normal. Concentration: normal.   Level of consciousness: alert  Knowledge: good and consistent with education.   Able to name object. Able to read. Able to repeat. Able to write. Normal comprehension.     Cranial Nerves     CN II   Visual fields full to confrontation.   Visual acuity: normal  Right visual field deficit: none  Left visual field deficit: none     CN III, IV, VI   Right pupil: Shape: regular. " Reactivity: brisk. Consensual response: intact.   Left pupil: Shape: regular. Reactivity: brisk. Consensual response: intact.   Nystagmus: none   Diplopia: none  Ophthalmoparesis: none  Upgaze: normal  Downgaze: normal  Conjugate gaze: present  Vestibulo-ocular reflex: present    CN V   Facial sensation intact.   Right corneal reflex: normal  Left corneal reflex: normal    CN VII   Right facial weakness: none  Left facial weakness: none    CN VIII   Hearing: intact    CN IX, X   Palate: symmetric  Right gag reflex: normal  Left gag reflex: normal    CN XI   Right sternocleidomastoid strength: normal  Left sternocleidomastoid strength: normal    CN XII   Tongue: not atrophic  Fasciculations: absent  Tongue deviation: none    Motor Exam   Muscle bulk: normal  Overall muscle tone: normal  Right arm tone: normal  Left arm tone: normal  Right leg tone: normal  Left leg tone: normal    Sensory Exam   Light touch normal.   Vibration normal.   Proprioception normal.   Pinprick normal.     Gait, Coordination, and Reflexes     Gait  Gait: spastic and wide-based    Tremor   Resting tremor: absent  Intention tremor: absent  Action tremor: absent    Reflexes   Reflexes 2+ except as noted.       Physical Exam   Constitutional: He appears well-developed and well-nourished.   Nursing note and vitals reviewed.      Lab on 04/24/2020   Component Date Value Ref Range Status   • Glucose 04/24/2020 96  65 - 99 mg/dL Final   • BUN 04/24/2020 11  6 - 20 mg/dL Final   • Creatinine 04/24/2020 1.05  0.76 - 1.27 mg/dL Final   • Sodium 04/24/2020 137  136 - 145 mmol/L Final   • Potassium 04/24/2020 4.7  3.5 - 5.2 mmol/L Final   • Chloride 04/24/2020 98  98 - 107 mmol/L Final   • CO2 04/24/2020 29.6* 22.0 - 29.0 mmol/L Final   • Calcium 04/24/2020 9.4  8.6 - 10.5 mg/dL Final   • Total Protein 04/24/2020 7.1  6.0 - 8.5 g/dL Final   • Albumin 04/24/2020 4.00  3.50 - 5.20 g/dL Final   • ALT (SGPT) 04/24/2020 17  1 - 41 U/L Final   • AST (SGOT)  04/24/2020 10  1 - 40 U/L Final   • Alkaline Phosphatase 04/24/2020 53  39 - 117 U/L Final   • Total Bilirubin 04/24/2020 0.3  0.2 - 1.2 mg/dL Final   • eGFR Non African Amer 04/24/2020 74  >60 mL/min/1.73 Final   • Globulin 04/24/2020 3.1  gm/dL Final   • A/G Ratio 04/24/2020 1.3  g/dL Final   • BUN/Creatinine Ratio 04/24/2020 10.5  7.0 - 25.0 Final   • Anion Gap 04/24/2020 9.4  5.0 - 15.0 mmol/L Final   • WBC 04/24/2020 8.27  3.40 - 10.80 10*3/mm3 Final   • RBC 04/24/2020 5.14  4.14 - 5.80 10*6/mm3 Final   • Hemoglobin 04/24/2020 14.8  13.0 - 17.7 g/dL Final   • Hematocrit 04/24/2020 45.5  37.5 - 51.0 % Final   • MCV 04/24/2020 88.5  79.0 - 97.0 fL Final   • MCH 04/24/2020 28.8  26.6 - 33.0 pg Final   • MCHC 04/24/2020 32.5  31.5 - 35.7 g/dL Final   • RDW 04/24/2020 11.8* 12.3 - 15.4 % Final   • RDW-SD 04/24/2020 38.3  37.0 - 54.0 fl Final   • MPV 04/24/2020 12.4* 6.0 - 12.0 fL Final   • Platelets 04/24/2020 266  140 - 450 10*3/mm3 Final   • Neutrophil % 04/24/2020 58.4  42.7 - 76.0 % Final   • Lymphocyte % 04/24/2020 28.5  19.6 - 45.3 % Final   • Monocyte % 04/24/2020 11.4  5.0 - 12.0 % Final   • Eosinophil % 04/24/2020 1.1  0.3 - 6.2 % Final   • Basophil % 04/24/2020 0.4  0.0 - 1.5 % Final   • Immature Grans % 04/24/2020 0.2  0.0 - 0.5 % Final   • Neutrophils, Absolute 04/24/2020 4.83  1.70 - 7.00 10*3/mm3 Final   • Lymphocytes, Absolute 04/24/2020 2.36  0.70 - 3.10 10*3/mm3 Final   • Monocytes, Absolute 04/24/2020 0.94* 0.10 - 0.90 10*3/mm3 Final   • Eosinophils, Absolute 04/24/2020 0.09  0.00 - 0.40 10*3/mm3 Final   • Basophils, Absolute 04/24/2020 0.03  0.00 - 0.20 10*3/mm3 Final   • Immature Grans, Absolute 04/24/2020 0.02  0.00 - 0.05 10*3/mm3 Final   • nRBC 04/24/2020 0.0  0.0 - 0.2 /100 WBC Final         Assessment/Plan     Problem List Items Addressed This Visit        Respiratory    Intractable hiccups    Current Assessment & Plan     Controlled on Depakote             Nervous and Auditory    Multiple  sclerosis (CMS/HCC) - Primary    Current Assessment & Plan     MSFC scores are concerning    Recommend starting Ocrevus     MRI Brain/cervical     Labs     PT          Relevant Medications    methylPREDNISolone sodium succinate (SOLU-Medrol) 1,000 mg in sodium chloride 0.9 % 100 mL IVPB (Completed)    methylPREDNISolone sodium succinate (SOLU-Medrol) 1,000 mg in sodium chloride 0.9 % 100 mL IVPB (Completed)    methylPREDNISolone sodium succinate (SOLU-Medrol) 1,000 mg in sodium chloride 0.9 % 100 mL IVPB (Completed)    methylPREDNISolone sodium succinate (SOLU-Medrol) 1,000 mg in sodium chloride 0.9 % 100 mL IVPB (Completed)    methylPREDNISolone sodium succinate (SOLU-Medrol) 1,000 mg in sodium chloride 0.9 % 100 mL IVPB (Completed)    Other Relevant Orders    CBC & Differential    Comprehensive Metabolic Panel    Hepatitis Panel, Acute    MRI Brain With & Without Contrast    MRI Cervical Spine With & Without Contrast             No follow-ups on file.

## 2020-08-15 LAB
ALBUMIN SERPL-MCNC: 4.1 G/DL (ref 3.5–5.2)
ALBUMIN/GLOB SERPL: 1.4 G/DL
ALP SERPL-CCNC: 46 U/L (ref 39–117)
ALT SERPL W P-5'-P-CCNC: 18 U/L (ref 1–41)
ANION GAP SERPL CALCULATED.3IONS-SCNC: 13.5 MMOL/L (ref 5–15)
AST SERPL-CCNC: 12 U/L (ref 1–40)
BILIRUB SERPL-MCNC: 0.2 MG/DL (ref 0–1.2)
BUN SERPL-MCNC: 9 MG/DL (ref 6–20)
BUN/CREAT SERPL: 9.3 (ref 7–25)
CALCIUM SPEC-SCNC: 9.5 MG/DL (ref 8.6–10.5)
CHLORIDE SERPL-SCNC: 101 MMOL/L (ref 98–107)
CO2 SERPL-SCNC: 26.5 MMOL/L (ref 22–29)
CREAT SERPL-MCNC: 0.97 MG/DL (ref 0.76–1.27)
GFR SERPL CREATININE-BSD FRML MDRD: 81 ML/MIN/1.73
GLOBULIN UR ELPH-MCNC: 2.9 GM/DL
GLUCOSE SERPL-MCNC: 83 MG/DL (ref 65–99)
HOLD SPECIMEN: NORMAL
POTASSIUM SERPL-SCNC: 4.3 MMOL/L (ref 3.5–5.2)
PROT SERPL-MCNC: 7 G/DL (ref 6–8.5)
SODIUM SERPL-SCNC: 141 MMOL/L (ref 136–145)

## 2020-09-16 ENCOUNTER — APPOINTMENT (OUTPATIENT)
Dept: MRI IMAGING | Facility: HOSPITAL | Age: 50
End: 2020-09-16

## 2020-09-27 ENCOUNTER — HOSPITAL ENCOUNTER (OUTPATIENT)
Dept: MRI IMAGING | Facility: HOSPITAL | Age: 50
Discharge: HOME | End: 2020-09-27

## 2020-09-27 DIAGNOSIS — G35 MULTIPLE SCLEROSIS (HCC): ICD-10-CM

## 2020-09-27 PROCEDURE — 0 GADOBENATE DIMEGLUMINE 529 MG/ML SOLUTION: Performed by: PSYCHIATRY & NEUROLOGY

## 2020-09-27 PROCEDURE — A9577 INJ MULTIHANCE: HCPCS | Performed by: PSYCHIATRY & NEUROLOGY

## 2020-09-27 PROCEDURE — 72156 MRI NECK SPINE W/O & W/DYE: CPT

## 2020-09-27 PROCEDURE — 70553 MRI BRAIN STEM W/O & W/DYE: CPT

## 2020-09-27 RX ADMIN — GADOBENATE DIMEGLUMINE 18 ML: 529 INJECTION, SOLUTION INTRAVENOUS at 10:10

## 2020-10-06 ENCOUNTER — TELEPHONE (OUTPATIENT)
Dept: NEUROLOGY | Facility: CLINIC | Age: 50
End: 2020-10-06

## 2020-10-06 NOTE — TELEPHONE ENCOUNTER
----- Message from Man Williamson sent at 10/4/2020  9:29 PM EDT -----  Regarding: Test Results Question  Contact: [phone]  Good morning Dr. Benavides    I received a message through my chart with my MRI results, I tried to read them but they did not make any sense to me. Could you please message me back with the results using layman's terms or call my wife Lisa at [phone] so she can tell me.     With these results is there any chance in switching my meds to the twice a year one?    Thank you  Man Williamson

## 2021-01-12 RX ORDER — RENAGEL 800 MG/1
TABLET ORAL
Qty: 30 TABLET | Refills: 11 | Status: SHIPPED | OUTPATIENT
Start: 2021-01-12 | End: 2021-05-04

## 2021-02-22 ENCOUNTER — SPECIALTY PHARMACY (OUTPATIENT)
Dept: ONCOLOGY | Facility: HOSPITAL | Age: 51
End: 2021-02-22

## 2021-02-22 ENCOUNTER — TELEPHONE (OUTPATIENT)
Dept: NEUROLOGY | Facility: CLINIC | Age: 51
End: 2021-02-22

## 2021-02-22 DIAGNOSIS — G35 MULTIPLE SCLEROSIS (HCC): Primary | ICD-10-CM
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
## 2021-03-22 ENCOUNTER — OFFICE VISIT (OUTPATIENT)
Dept: NEUROLOGY | Facility: CLINIC | Age: 51
End: 2021-03-22

## 2021-03-22 ENCOUNTER — LAB (OUTPATIENT)
Dept: LAB | Facility: HOSPITAL | Age: 51
End: 2021-03-22

## 2021-03-22 VITALS
HEIGHT: 74 IN | DIASTOLIC BLOOD PRESSURE: 76 MMHG | HEART RATE: 102 BPM | OXYGEN SATURATION: 96 % | BODY MASS INDEX: 24.38 KG/M2 | SYSTOLIC BLOOD PRESSURE: 118 MMHG

## 2021-03-22 DIAGNOSIS — R06.6 INTRACTABLE HICCUPS: Chronic | ICD-10-CM

## 2021-03-22 DIAGNOSIS — G35 MULTIPLE SCLEROSIS (HCC): Primary | Chronic | ICD-10-CM

## 2021-03-22 DIAGNOSIS — F33.2 SEVERE EPISODE OF RECURRENT MAJOR DEPRESSIVE DISORDER, WITHOUT PSYCHOTIC FEATURES (HCC): Chronic | ICD-10-CM

## 2021-03-22 DIAGNOSIS — G35 MULTIPLE SCLEROSIS (HCC): ICD-10-CM

## 2021-03-22 LAB
ALBUMIN SERPL-MCNC: 3.7 G/DL (ref 3.5–5.2)
ALBUMIN/GLOB SERPL: 1.4 G/DL
ALP SERPL-CCNC: 66 U/L (ref 39–117)
ALT SERPL W P-5'-P-CCNC: 16 U/L (ref 1–41)
ANION GAP SERPL CALCULATED.3IONS-SCNC: 8 MMOL/L (ref 5–15)
AST SERPL-CCNC: 15 U/L (ref 1–40)
BASOPHILS # BLD AUTO: 0.08 10*3/MM3 (ref 0–0.2)
BASOPHILS NFR BLD AUTO: 0.9 % (ref 0–1.5)
BILIRUB SERPL-MCNC: 0.3 MG/DL (ref 0–1.2)
BUN SERPL-MCNC: 9 MG/DL (ref 6–20)
BUN/CREAT SERPL: 7.4 (ref 7–25)
CALCIUM SPEC-SCNC: 8.5 MG/DL (ref 8.6–10.5)
CHLORIDE SERPL-SCNC: 100 MMOL/L (ref 98–107)
CO2 SERPL-SCNC: 30 MMOL/L (ref 22–29)
CREAT SERPL-MCNC: 1.22 MG/DL (ref 0.76–1.27)
DEPRECATED RDW RBC AUTO: 50.2 FL (ref 37–54)
EOSINOPHIL # BLD AUTO: 0.1 10*3/MM3 (ref 0–0.4)
EOSINOPHIL NFR BLD AUTO: 1.1 % (ref 0.3–6.2)
ERYTHROCYTE [DISTWIDTH] IN BLOOD BY AUTOMATED COUNT: 14.6 % (ref 12.3–15.4)
GFR SERPL CREATININE-BSD FRML MDRD: 62 ML/MIN/1.73
GLOBULIN UR ELPH-MCNC: 2.7 GM/DL
GLUCOSE SERPL-MCNC: 91 MG/DL (ref 65–99)
HCT VFR BLD AUTO: 46.9 % (ref 37.5–51)
HGB BLD-MCNC: 14.4 G/DL (ref 13–17.7)
IMM GRANULOCYTES # BLD AUTO: 0.03 10*3/MM3 (ref 0–0.05)
IMM GRANULOCYTES NFR BLD AUTO: 0.3 % (ref 0–0.5)
LYMPHOCYTES # BLD AUTO: 2.38 10*3/MM3 (ref 0.7–3.1)
LYMPHOCYTES NFR BLD AUTO: 26.3 % (ref 19.6–45.3)
MCH RBC QN AUTO: 28.5 PG (ref 26.6–33)
MCHC RBC AUTO-ENTMCNC: 30.7 G/DL (ref 31.5–35.7)
MCV RBC AUTO: 92.9 FL (ref 79–97)
MONOCYTES # BLD AUTO: 0.84 10*3/MM3 (ref 0.1–0.9)
MONOCYTES NFR BLD AUTO: 9.3 % (ref 5–12)
NEUTROPHILS NFR BLD AUTO: 5.62 10*3/MM3 (ref 1.7–7)
NEUTROPHILS NFR BLD AUTO: 62.1 % (ref 42.7–76)
NRBC BLD AUTO-RTO: 0 /100 WBC (ref 0–0.2)
PLATELET # BLD AUTO: 279 10*3/MM3 (ref 140–450)
PMV BLD AUTO: 12 FL (ref 6–12)
POTASSIUM SERPL-SCNC: 4.5 MMOL/L (ref 3.5–5.2)
PROT SERPL-MCNC: 6.4 G/DL (ref 6–8.5)
RBC # BLD AUTO: 5.05 10*6/MM3 (ref 4.14–5.8)
SODIUM SERPL-SCNC: 138 MMOL/L (ref 136–145)
WBC # BLD AUTO: 9.05 10*3/MM3 (ref 3.4–10.8)

## 2021-03-22 PROCEDURE — 85025 COMPLETE CBC W/AUTO DIFF WBC: CPT

## 2021-03-22 PROCEDURE — 80074 ACUTE HEPATITIS PANEL: CPT

## 2021-03-22 PROCEDURE — 86334 IMMUNOFIX E-PHORESIS SERUM: CPT

## 2021-03-22 PROCEDURE — 99214 OFFICE O/P EST MOD 30 MIN: CPT | Performed by: PSYCHIATRY & NEUROLOGY

## 2021-03-22 PROCEDURE — 80053 COMPREHEN METABOLIC PANEL: CPT

## 2021-03-22 PROCEDURE — 36415 COLL VENOUS BLD VENIPUNCTURE: CPT

## 2021-03-22 PROCEDURE — 82784 ASSAY IGA/IGD/IGG/IGM EACH: CPT

## 2021-03-22 NOTE — ASSESSMENT & PLAN NOTE
Patient's depression is recurrent and is moderate without psychosis. Their depression is currently in partial remission and the condition is unchanged. This will be reassessed at the next regular appointment. F/U as described:patient will continue current medication therapy.

## 2021-03-22 NOTE — PROGRESS NOTES
"Chief Complaint  Multiple Sclerosis    Subjective          Man Shiva Williamson presents to [facility] for RRMS, intractable hiccups.      History of Present Illness    50 y.o. male returns in follow up.  Last visit on 8/14/2020 continued VPA, ordered MRI Brain/cerivcal and labs, recommended starting Ocrevus.     MOG - neg  NMO - neg     8/14/21 CBC,CMP, Hep pnl, TB - NCS      MRI Brain/cerivcal, my review of films, 9/27/20  extensive atrophy and T2 lesion load, multiple cord lesions     Denies falls.      Balance is unsteady, memory is decreased.  Mood is irritable.  .       Hiccups resolved and using Depakote ER 1000 mg qhs prn     Problem history:      Pt admitted 11/8/19 for several day history of dizziness. October 2019 new groin N/T and unsteady gait.       UDS + THC, opiates, BZD  MRI Brain, my review of films, 11/18/19 multiple enhancing white matter lesions.      CSF 11/26/19 8 OCB, IgG syn rate 73.7; protein 48.5     Treated with IVMP for 5 days in 12/2019.  Felt better for 10 days but sx have now returned.       Frequent hiccups, decreased attention and concentration.  Gait is unsteady.  Numbness in groin has resolved.     Four recent falls.  Dislocated shoulder on 11/29/19.      Reports intermittent hiccups.      Objective   Vital Signs:   /76   Pulse 102   Ht 188 cm (74.02\")   SpO2 96%   BMI 24.38 kg/m²     Physical Exam  Eyes:      Extraocular Movements: EOM normal.      Pupils: Pupils are equal, round, and reactive to light.   Neurological:      Mental Status: He is oriented to person, place, and time.      Deep Tendon Reflexes: Strength normal.   Psychiatric:         Speech: Speech normal.          Neurologic Exam     Mental Status   Oriented to person, place, and time.   Registration: recalls 3 of 3 objects. Recall at 5 minutes: recalls 3 of 3 objects. Follows 3 step commands.   Attention: normal. Concentration: normal.   Speech: speech is normal   Level of " consciousness: alert  Knowledge: good and consistent with education.   Able to name object. Able to read. Able to repeat. Able to write. Normal comprehension.     Cranial Nerves   Cranial nerves II through XII intact.     CN II   Visual fields full to confrontation.   Visual acuity: normal  Right visual field deficit: none  Left visual field deficit: none     CN III, IV, VI   Pupils are equal, round, and reactive to light.  Extraocular motions are normal.   Right pupil: Shape: regular. Reactivity: brisk. Consensual response: intact.   Left pupil: Shape: regular. Reactivity: brisk. Consensual response: intact.   Nystagmus: none   Diplopia: none  Ophthalmoparesis: none  Upgaze: normal  Downgaze: normal  Conjugate gaze: present  Vestibulo-ocular reflex: present    CN V   Facial sensation intact.   Right corneal reflex: normal  Left corneal reflex: normal    CN VII   Right facial weakness: none  Left facial weakness: none    CN VIII   Hearing: intact    CN IX, X   Palate: symmetric  Right gag reflex: normal  Left gag reflex: normal    CN XI   Right sternocleidomastoid strength: normal  Left sternocleidomastoid strength: normal    CN XII   Tongue: not atrophic  Fasciculations: absent  Tongue deviation: none    Motor Exam   Muscle bulk: normal  Overall muscle tone: normal  Right arm tone: normal  Left arm tone: normal  Right leg tone: normal  Left leg tone: normal    Strength   Strength 5/5 throughout.     Sensory Exam   Light touch normal.   Vibration normal.   Proprioception normal.   Pinprick normal.     Gait, Coordination, and Reflexes     Gait  Gait: spastic and wide-based    Tremor   Resting tremor: absent  Intention tremor: absent  Action tremor: absent    Reflexes   Reflexes 2+ except as noted.      Result Review :   The following data was reviewed by: Kal Benavides MD on 03/22/2021:  Common labs    Common Labsle 4/24/20 4/24/20 8/14/20 8/14/20    1444 1444 1201 1201   Glucose  96  83   BUN  11  9   Creatinine   1.05  0.97   eGFR Non African Am  74  81   Sodium  137  141   Potassium  4.7  4.3   Chloride  98  101   Calcium  9.4  9.5   Albumin  4.00  4.10   Total Bilirubin  0.3  0.2   Alkaline Phosphatase  53  46   AST (SGOT)  10  12   ALT (SGPT)  17  18   WBC 8.27  8.98    Hemoglobin 14.8  14.8    Hematocrit 45.5  44.8    Platelets 266  288            Data reviewed: Radiologic studies MRI B/C          Assessment and Plan    Diagnoses and all orders for this visit:    1. Multiple sclerosis (CMS/HCC) (Primary)  Assessment & Plan:  Worsening sx on Aubagio    CBC, CMP, Hep pnl, Immunoglobulins     Start Kesimpta     Orders:  -     CBC & Differential; Future  -     Comprehensive Metabolic Panel; Future  -     Hepatitis Panel, Acute; Future  -     Immunofixation, Serum; Future    2. Severe episode of recurrent major depressive disorder, without psychotic features (CMS/HCC)  Assessment & Plan:  Patient's depression is recurrent and is moderate without psychosis. Their depression is currently in partial remission and the condition is unchanged. This will be reassessed at the next regular appointment. F/U as described:patient will continue current medication therapy.      3. Intractable hiccups  Assessment & Plan:  Sx controlled on Depakote prn         Follow Up   No follow-ups on file.  Patient was given instructions and counseling regarding his condition or for health maintenance advice. Please see specific information pulled into the AVS if appropriate.

## 2021-03-23 LAB
HAV IGM SERPL QL IA: NORMAL
HBV CORE IGM SERPL QL IA: NORMAL
HBV SURFACE AG SERPL QL IA: NORMAL
HCV AB SER DONR QL: NORMAL

## 2021-03-24 LAB
IGA SERPL-MCNC: 277 MG/DL (ref 90–386)
IGG SERPL-MCNC: 816 MG/DL (ref 603–1613)
IGM SERPL-MCNC: 113 MG/DL (ref 20–172)
PROT PATTERN SERPL IFE-IMP: NORMAL

## 2021-04-14 ENCOUNTER — TELEPHONE (OUTPATIENT)
Dept: NEUROLOGY | Facility: CLINIC | Age: 51
End: 2021-04-14

## 2021-04-14 NOTE — TELEPHONE ENCOUNTER
"Spoke with patient. He was insistent on speaking with Dr. Benavides, I told him that I can get him scheduled for a telephone or video visit. Asked patient if I cold help him with anything and he said \"he did not like the current medication he was on for his MS. And does not want to inject him daily or weekly for the Kesimpta.\" I informed patient that Kesimpta is a weekly injection for 3 weeks and then will be a monthly injection starting week 5. Patient asked how does he get rid of the needle. I told him that it was a prefilled auto-injector and educated him on how to use injector and that he can discard injector in a box sent from the company or take them to his pharmacy once they have been used. Patient then stated \"he does not want to do that and he would like to do the 2 twice a year medication.\" I informed patient I would notify Dr. Benavides that patient wishes to start Ocrevus and not Kesimpta.     Patient still wishes to speak with Dr. Benavides so I was able to schedule hime for 5/3/2021. He was not pleased that I could not get him in any earlier but I explained to patient that Dr. Benavides's schedule is full and I could place him on the waitlist.   "

## 2021-04-14 NOTE — TELEPHONE ENCOUNTER
Caller: PT      Relationship: SELF     Best call back number: [phone]    What is the best time to reach you: ANYTIME     Who are you requesting to speak with (clinical staff, provider,  specific staff member): DR. GALDAMEZ     Do you know the name of the person who called: KIM     What was the call regarding: PT WON'T SAY ANYTHING OTHER THAN HE WANTS TO SPEAK TO DR. GALDAMEZ.     Do you require a callback:YES

## 2021-05-04 ENCOUNTER — TELEMEDICINE (OUTPATIENT)
Dept: NEUROLOGY | Facility: CLINIC | Age: 51
End: 2021-05-04

## 2021-05-04 DIAGNOSIS — F33.2 SEVERE EPISODE OF RECURRENT MAJOR DEPRESSIVE DISORDER, WITHOUT PSYCHOTIC FEATURES (HCC): Chronic | ICD-10-CM

## 2021-05-04 DIAGNOSIS — G35 MULTIPLE SCLEROSIS (HCC): Primary | Chronic | ICD-10-CM

## 2021-05-04 DIAGNOSIS — R06.6 INTRACTABLE HICCUPS: Chronic | ICD-10-CM

## 2021-05-04 PROCEDURE — 99214 OFFICE O/P EST MOD 30 MIN: CPT | Performed by: PSYCHIATRY & NEUROLOGY

## 2021-05-04 RX ORDER — FAMOTIDINE 10 MG/ML
20 INJECTION, SOLUTION INTRAVENOUS AS NEEDED
OUTPATIENT
Start: 2021-05-04

## 2021-05-04 RX ORDER — SODIUM CHLORIDE 9 MG/ML
250 INJECTION, SOLUTION INTRAVENOUS ONCE
OUTPATIENT
Start: 2021-05-04

## 2021-05-04 RX ORDER — DIPHENHYDRAMINE HYDROCHLORIDE 50 MG/ML
25 INJECTION INTRAMUSCULAR; INTRAVENOUS ONCE
OUTPATIENT
Start: 2021-05-04

## 2021-05-04 RX ORDER — DIPHENHYDRAMINE HYDROCHLORIDE 50 MG/ML
50 INJECTION INTRAMUSCULAR; INTRAVENOUS AS NEEDED
OUTPATIENT
Start: 2021-05-04

## 2021-05-04 RX ORDER — IBUPROFEN 400 MG/1
400 TABLET ORAL EVERY 6 HOURS PRN
OUTPATIENT
Start: 2021-05-04

## 2021-05-04 RX ORDER — MEPERIDINE HYDROCHLORIDE 50 MG/ML
25 INJECTION INTRAMUSCULAR; INTRAVENOUS; SUBCUTANEOUS
OUTPATIENT
Start: 2021-05-04

## 2021-05-04 NOTE — PROGRESS NOTES
You have chosen to receive care through a telehealth visit.  Do you consent to use a video/audio connection for your medical care today? Yes     Time:  11 minutes    Chief Complaint    Multiple Sclerosis    Subjective          Man Shiva Williamson presents to [facility] NEUROLOG[facility]     History of Present Illness    50 y.o. male returns in follow up.  Last visit on 3/22/21 rx Kesimpta, ordered labs, VPA prn.      RRMS    3/22/21 Ig, Hep pnl, CMP, CBC - NCS     Stopped Kesimpta as denied by insurance.      Balance is unsteady, memory is decreased.  Mood is irritable.        Hiccups resolved and using Depakote ER 1000 mg qhs prn     Problem history:      Pt admitted 11/8/19 for several day history of dizziness. October 2019 new groin N/T and unsteady gait.       UDS + THC, opiates, BZD  MRI Brain, my review of films, 11/18/19 multiple enhancing white matter lesions.      CSF 11/26/19 8 OCB, IgG syn rate 73.7; protein 48.5     Treated with IVMP for 5 days in 12/2019.  Felt better for 10 days but sx have now returned.       Frequent hiccups, decreased attention and concentration.  Gait is unsteady.  Numbness in groin has resolved.     Four recent falls.  Dislocated shoulder on 11/29/19.      Reports intermittent hiccups.     MDD    Mood is stable.     Intractable Hiccups     No longer having hiccups and no longer taking VPA.     Objective      Physical Exam  Neurological:      Mental Status: He is oriented to person, place, and time.   Psychiatric:         Speech: Speech is slurred.          Neurologic Exam     Mental Status   Oriented to person, place, and time.   Speech: slurred   Level of consciousness: alert  Knowledge: poor.   Abnormal comprehension.      Result Review :   The following data was reviewed by: Kal Benavides MD on 05/03/2021:  Common labs    Common Labsle 8/14/20 8/14/20 3/22/21 3/22/21    1201 1201 1559 1559   Glucose  83  91   BUN  9  9   Creatinine  0.97  1.22   eGFR Non African  Am  81  62   Sodium  141  138   Potassium  4.3  4.5   Chloride  101  100   Calcium  9.5  8.5 (A)   Albumin  4.10  3.70   Total Bilirubin  0.2  0.3   Alkaline Phosphatase  46  66   AST (SGOT)  12  15   ALT (SGPT)  18  16   WBC 8.98  9.05    Hemoglobin 14.8  14.4    Hematocrit 44.8  46.9    Platelets 288  279    (A) Abnormal value                      Assessment and Plan    Diagnoses and all orders for this visit:    1. Multiple sclerosis (CMS/HCC) (Primary)  Assessment & Plan:  Start Ocrevus     EDSS 5.5           2. Intractable hiccups  Assessment & Plan:  Continue VPA prn       3. Severe episode of recurrent major depressive disorder, without psychotic features (CMS/HCC)  Assessment & Plan:  Patient's depression is recurrent and is moderate without psychosis. Their depression is currently in partial remission and the condition is unchanged. This will be reassessed at the next regular appointment. F/U as described:patient will continue current medication therapy.        Follow Up   No follow-ups on file.  Patient was given instructions and counseling regarding his condition or for health maintenance advice. Please see specific information pulled into the AVS if appropriate.

## 2022-01-06 ENCOUNTER — NURSING HOME (OUTPATIENT)
Dept: INTERNAL MEDICINE | Facility: CLINIC | Age: 52
End: 2022-01-06

## 2022-01-06 DIAGNOSIS — G35 MULTIPLE SCLEROSIS: Primary | ICD-10-CM

## 2022-01-06 DIAGNOSIS — R53.81 PHYSICAL DEBILITY: ICD-10-CM

## 2022-01-06 DIAGNOSIS — R00.0 TACHYCARDIA: ICD-10-CM

## 2022-01-06 DIAGNOSIS — I10 PRIMARY HYPERTENSION: ICD-10-CM

## 2022-01-06 DIAGNOSIS — K21.9 GASTROESOPHAGEAL REFLUX DISEASE WITHOUT ESOPHAGITIS: ICD-10-CM

## 2022-01-06 PROCEDURE — 99306 1ST NF CARE HIGH MDM 50: CPT | Performed by: INTERNAL MEDICINE

## 2022-01-10 VITALS
SYSTOLIC BLOOD PRESSURE: 134 MMHG | DIASTOLIC BLOOD PRESSURE: 91 MMHG | TEMPERATURE: 98.6 F | HEART RATE: 54 BPM | OXYGEN SATURATION: 93 % | RESPIRATION RATE: 18 BRPM

## 2022-01-10 NOTE — PROGRESS NOTES
Nursing Home History and Physical       Héctor DO Walter []  MIKAYLA Auhja []  [address] [place] [address]  Phone: [phone]  Fax: [phone] Elba Barbour MD []  Juan Pablo Olivarez DO [x]   [address] [place] [address]  Phone: [phone]  Fax: [phone]     PATIENT NAME: Man Rolle                                                                          YOB: 1970           DATE OF SERVICE: 01/06/2022  FACILITY:  []  [place]   [x] [facility]    [] [facility]   [] [facility]   []  [facility]   []  Other ______________________________________________________________________    CHIEF COMPLAINT:  Nursing facility admission      HISTORY OF PRESENT ILLNESS:   Patient is a 51-year-old white male with a history of MS with multiple falls and functional decline who presented to [facility] for acute kidney injury and pneumonia.  Patient was treated with IV cefepime for 7 days and was also started on metoprolol for sinus tachycardia.  Given his inability to manage himself at home and physical debility, he was transferred to this facility for rehab and strengthening.  On exam today, patient was resting comfortably in his bed in stated he was looking forward to working with physical therapy.  He denied any significant pain.  He was content with his care so far.    PAST MEDICAL & SURGICAL HISTORY:   Past Medical History:   Diagnosis Date   • Kidney stone    • Low back pain    • Multiple lesions on computed tomography of brain and spine    • Pneumonia       Past Surgical History:   Procedure Laterality Date   • TONSILLECTOMY           MEDICATIONS:  I have reviewed and reconciled the patients medication list in the patients chart at the skilled nursing facility on 01/06/2022.      ALLERGIES:  Allergies   Allergen Reactions   • Acetaminophen Irritability         SOCIAL HISTORY:  Social History     Socioeconomic History   • Marital status:     Tobacco Use   • Smoking status: Former Smoker     Types: Cigarettes, Cigars     Quit date: 2016     Years since quittin.1   • Smokeless tobacco: Former User     Types: Chew   Substance and Sexual Activity   • Alcohol use: Yes     Comment: rare   • Drug use: Yes     Types: Marijuana, Benzodiazepines     Comment: abnormal urine drug screen   • Sexual activity: Yes     Partners: Female     Birth control/protection: Surgical       FAMILY HISTORY:  Family History   Problem Relation Age of Onset   • Hypertension Mother    • Other Mother    • Coronary artery disease Father         REVIEW OF SYSTEMS:  Review of Systems   Constitutional: Negative for chills, fatigue and fever.   HENT: Negative for congestion, ear pain, rhinorrhea, sinus pressure and sore throat.    Eyes: Negative for visual disturbance.   Respiratory: Negative for cough, chest tightness, shortness of breath and wheezing.    Cardiovascular: Negative for chest pain, palpitations and leg swelling.   Gastrointestinal: Negative for abdominal pain, blood in stool, constipation, diarrhea, nausea and vomiting.   Endocrine: Negative for polydipsia and polyuria.   Genitourinary: Negative for dysuria and hematuria.   Musculoskeletal: Negative for arthralgias and back pain.   Skin: Negative for rash.   Neurological: Negative for dizziness, light-headedness, numbness and headaches.   Psychiatric/Behavioral: Negative for dysphoric mood and sleep disturbance. The patient is not nervous/anxious.          PHYSICAL EXAMINATION:   VITAL SIGNS: /91   Pulse 54   Temp 98.6 °F (37 °C)   Resp 18   SpO2 93%     Physical Exam  Vitals and nursing note reviewed.   Constitutional:       General: He is not in acute distress.     Appearance: Normal appearance. He is well-developed.      Comments: Frail bedridden male   HENT:      Head: Normocephalic and atraumatic.      Nose: Nose normal.      Mouth/Throat:      Mouth: Mucous membranes are moist.       Pharynx: No oropharyngeal exudate.   Eyes:      General: No scleral icterus.     Extraocular Movements: Extraocular movements intact.      Conjunctiva/sclera: Conjunctivae normal.      Pupils: Pupils are equal, round, and reactive to light.   Neck:      Thyroid: No thyromegaly.   Cardiovascular:      Rate and Rhythm: Normal rate and regular rhythm.      Heart sounds: Normal heart sounds. No murmur heard.  No friction rub. No gallop.    Pulmonary:      Effort: Pulmonary effort is normal. No respiratory distress.      Breath sounds: Normal breath sounds. No wheezing.   Abdominal:      General: Bowel sounds are normal. There is no distension.      Palpations: Abdomen is soft.      Tenderness: There is no abdominal tenderness.   Musculoskeletal:         General: No deformity or signs of injury.      Cervical back: Normal range of motion and neck supple.   Lymphadenopathy:      Cervical: No cervical adenopathy.   Skin:     General: Skin is warm and dry.      Findings: No rash.   Neurological:      General: No focal deficit present.      Mental Status: He is alert and oriented to person, place, and time.      Motor: Weakness (generalized) present.   Psychiatric:         Mood and Affect: Mood normal.         Behavior: Behavior normal.         RECORDS REVIEW:   Discharge Summary from [facility] 1/5/22    ASSESSMENT   Diagnoses and all orders for this visit:    1. Multiple sclerosis (HCC) (Primary)    2. Primary hypertension    3. Gastroesophageal reflux disease without esophagitis    4. Tachycardia    5. Physical debility        PLAN     Multiple sclerosis  -Not currently on any medications.  Continue supportive care in the nursing facility.  -If there is decline in functional status, consider neurology evaluation for further work-up.  -Obtain CBC, CMP, lipids, TSH, A1c.     Pneumonia  -Resolved during hospitalization.  No signs of residual shortness of breath or cough.    Recurrent falls  -Start physical therapy  for strengthening.    Acute kidney injury  -Resolved since discharge from hospital.  Monitor renal function while in facility.  -Obtain CBC and CMP    Sinus tachycardia  -Well-controlled on metoprolol.      [x]  Discussed Patient in detail with nursing/staff, addressed all needs today.     [x]  Plan of Care Reviewed   [x]  PT/OT Reviewed   [x]  Order Changes  []  Discharge Plans Reviewed  [x]  Advance Directive on file with Nursing Home.   [x]  POA on file with Nursing Home.    [x]  Code Status listed and reviewed.       Juan Pablo Olivarez DO.  1/10/2022      **Part of this note may be an electronic transcription/translation of spoken language to printed text using the Dragon Dictation System.**

## 2022-01-21 ENCOUNTER — NURSING HOME (OUTPATIENT)
Dept: INTERNAL MEDICINE | Facility: CLINIC | Age: 52
End: 2022-01-21

## 2022-01-21 VITALS
OXYGEN SATURATION: 96 % | DIASTOLIC BLOOD PRESSURE: 62 MMHG | RESPIRATION RATE: 20 BRPM | HEART RATE: 78 BPM | TEMPERATURE: 97.1 F | SYSTOLIC BLOOD PRESSURE: 100 MMHG

## 2022-01-21 DIAGNOSIS — I10 PRIMARY HYPERTENSION: Primary | ICD-10-CM

## 2022-01-21 DIAGNOSIS — R53.81 PHYSICAL DEBILITY: ICD-10-CM

## 2022-01-21 DIAGNOSIS — G35 MULTIPLE SCLEROSIS: ICD-10-CM

## 2022-01-21 DIAGNOSIS — R00.0 TACHYCARDIA: ICD-10-CM

## 2022-01-21 PROCEDURE — 99308 SBSQ NF CARE LOW MDM 20: CPT | Performed by: PHYSICIAN ASSISTANT

## 2022-01-28 NOTE — PROGRESS NOTES
Nursing Home Progress Note        Héctor Watson DO []  MIKAYLA Ahuja []  [address] [place] [address]  Phone: [phone]  Fax: [phone] Elab Barbour MD []  Juan Pablo Olivarez DO []  Magi Cohen PA-C [x]   [address] [place] [address]  Phone: [phone]  Fax: [phone]     PATIENT NAME: Man Williamson                                                                          YOB: 1970           DATE OF SERVICE: 2022  FACILITY: [facility]    CHIEF COMPLAINT:  Follow up on HTN.       HISTORY OF PRESENT ILLNESS:   Mr. Williamson is a 51-year of age male with history of multiple sclerosis, recurrent falls, and functional decline.  He was recently hospitalized at [facility] for OSWALDO pneumonia.  He did have sinus tachycardia for which he was initiated on metoprolol.  Nursing shared concerns for hypotension, systolic 100 today.  No reports of hypoxia.  Otherwise patient presents with his baseline.    PAST MEDICAL & SURGICAL HISTORY:   Past Medical History:   Diagnosis Date   • Kidney stone    • Low back pain    • Multiple lesions on computed tomography of brain and spine    • Pneumonia       Past Surgical History:   Procedure Laterality Date   • TONSILLECTOMY           MEDICATIONS:  I have reviewed and reconciled the patients medication list in the patients chart at the skilled nursing facility today.      ALLERGIES:  Allergies   Allergen Reactions   • Acetaminophen Irritability         SOCIAL HISTORY:  Social History     Socioeconomic History   • Marital status:    Tobacco Use   • Smoking status: Former Smoker     Types: Cigarettes, Cigars     Quit date: 2016     Years since quittin.2   • Smokeless tobacco: Former User     Types: Chew   Substance and Sexual Activity   • Alcohol use: Yes     Comment: rare   • Drug use: Yes     Types: Marijuana, Benzodiazepines     Comment: abnormal urine drug screen   • Sexual activity: Yes      Partners: Female     Birth control/protection: Surgical       FAMILY HISTORY:  Family History   Problem Relation Age of Onset   • Hypertension Mother    • Other Mother    • Coronary artery disease Father        REVIEW OF SYSTEMS:    Generalized weakness and chronic fatigue.   All other systems were reviewed and are negative.     PHYSICAL EXAMINATION:     VITAL SIGNS:  /62   Pulse 78   Temp 97.1 °F (36.2 °C)   Resp 20   SpO2 96%     Nursing notes and vital signs reviewed.   General Appearance:  Frail elderly male, lying in bed. NAD.   Head: Normocephalic and atraumatic, without obvious abnormality     Eyes: PERRLA.  Conjunctivae and sclerae normal.  EOM are within normal limits.    Neck: Normal range of motion. Neck supple. No JVD present.   Cardiovascular: Normal rate, regular rhythm.  Pulses palpable equal bilaterally.    Pulmonary/Chest: Effort normal and breath sounds normal. No respiratory distress.   Abdominal: Soft. Bowel sounds are normal. No distention or tenderness.     Musculoskeletal: Normal range of motion. No edema.   Neurological: Alert and oriented at baseline.    Skin: Skin is warm and dry.   Psychiatric:  Normal mood.  Flat affect. Normal behavior.     RECORDS REVIEW:   Admission labs: 1/7; Glucose 74, sodium 136, potassium 4.7, A1c 5.4, WBCs 12.3, hemoglobin 13.8, hematocrit 40.7, TSH 3.009    ASSESSMENT     Diagnoses and all orders for this visit:    1. Primary hypertension (Primary)    2. Tachycardia    3. Multiple sclerosis (HCC)    4. Physical debility        PLAN  primary hypertension/tachycardia: Was previously placed on metoprolol for tachycardia.  Heart rate appropriate, systolic low at 100.  Will place parameters to hold metoprolol for systolic less than 110.    MS/physical debility: Continue PT/OT as indicated.  Fall precautions in place.  Continue with supportive care at SNF.    [x]  Discussed Patient in detail with nursing/staff, addressed all needs today.     [x]  Plan of  Care Reviewed   []  PT/OT Reviewed   []  Order Changes  []  Discharge Plans Reviewed  [x]  Advance Directive on file with Nursing Home.   [x]  POA on file with Nursing Home.    [x]  Code Status: FULL          Magi Cohen PA-C.  1/28/2022

## 2022-02-18 ENCOUNTER — NURSING HOME (OUTPATIENT)
Dept: INTERNAL MEDICINE | Facility: CLINIC | Age: 52
End: 2022-02-18

## 2022-02-18 DIAGNOSIS — I10 PRIMARY HYPERTENSION: ICD-10-CM

## 2022-02-18 DIAGNOSIS — G35 MULTIPLE SCLEROSIS: ICD-10-CM

## 2022-02-18 DIAGNOSIS — R53.81 PHYSICAL DEBILITY: ICD-10-CM

## 2022-02-18 DIAGNOSIS — U07.1 COVID-19: Primary | ICD-10-CM

## 2022-02-18 DIAGNOSIS — R00.0 TACHYCARDIA: ICD-10-CM

## 2022-02-18 PROCEDURE — 99309 SBSQ NF CARE MODERATE MDM 30: CPT | Performed by: PHYSICIAN ASSISTANT

## 2022-02-25 ENCOUNTER — NURSING HOME (OUTPATIENT)
Dept: INTERNAL MEDICINE | Facility: CLINIC | Age: 52
End: 2022-02-25

## 2022-02-25 DIAGNOSIS — R53.81 PHYSICAL DEBILITY: ICD-10-CM

## 2022-02-25 DIAGNOSIS — U07.1 COVID-19: Primary | ICD-10-CM

## 2022-02-25 DIAGNOSIS — G35 MULTIPLE SCLEROSIS: ICD-10-CM

## 2022-02-25 DIAGNOSIS — I10 PRIMARY HYPERTENSION: ICD-10-CM

## 2022-02-25 DIAGNOSIS — R00.0 TACHYCARDIA: ICD-10-CM

## 2022-02-25 PROCEDURE — 99308 SBSQ NF CARE LOW MDM 20: CPT | Performed by: PHYSICIAN ASSISTANT

## 2022-03-01 ENCOUNTER — NURSING HOME (OUTPATIENT)
Dept: INTERNAL MEDICINE | Facility: CLINIC | Age: 52
End: 2022-03-01

## 2022-03-01 ENCOUNTER — HOME HEALTH ADMISSION (OUTPATIENT)
Dept: HOME HEALTH SERVICES | Facility: HOME HEALTHCARE | Age: 52
End: 2022-03-01

## 2022-03-01 VITALS
HEART RATE: 99 BPM | WEIGHT: 132.6 LBS | DIASTOLIC BLOOD PRESSURE: 58 MMHG | OXYGEN SATURATION: 96 % | BODY MASS INDEX: 17.02 KG/M2 | TEMPERATURE: 96.7 F | SYSTOLIC BLOOD PRESSURE: 97 MMHG | RESPIRATION RATE: 14 BRPM

## 2022-03-01 DIAGNOSIS — Z86.16 HISTORY OF COVID-19: ICD-10-CM

## 2022-03-01 DIAGNOSIS — R53.81 PHYSICAL DEBILITY: ICD-10-CM

## 2022-03-01 DIAGNOSIS — G35 MULTIPLE SCLEROSIS: Primary | ICD-10-CM

## 2022-03-01 DIAGNOSIS — R00.0 TACHYCARDIA: ICD-10-CM

## 2022-03-01 PROCEDURE — 99315 NF DSCHRG MGMT 30 MIN/LESS: CPT | Performed by: PHYSICIAN ASSISTANT

## 2022-03-01 RX ORDER — DEXAMETHASONE 6 MG/1
6 TABLET ORAL DAILY
COMMUNITY
End: 2022-03-07

## 2022-03-01 RX ORDER — SENNOSIDES 8.6 MG
650 CAPSULE ORAL EVERY 6 HOURS PRN
COMMUNITY

## 2022-03-01 RX ORDER — ALBUTEROL SULFATE 90 UG/1
1 AEROSOL, METERED RESPIRATORY (INHALATION) 2 TIMES DAILY
COMMUNITY
End: 2022-03-07

## 2022-03-01 RX ORDER — ALBUTEROL SULFATE 90 UG/1
1 AEROSOL, METERED RESPIRATORY (INHALATION) EVERY 4 HOURS PRN
COMMUNITY

## 2022-03-07 NOTE — PROGRESS NOTES
Nursing Home Discharge Summary      Héctor Watson DO  []  MIKAYLA Ahuja  []  [address], [place] [address]  Phone: [phone]  Fax: [phone] Elba Barbour MD  []  Juan Pablo Olivarez DO  []  Magi Cohen PA-C  [x]  [address] [place] [address]  Phone: [phone]  Fax: [phone]     PATIENT NAME: Man Williamson                                                                          YOB: 1970     Age:  51 y.o.  Sex:  male  DATE OF SERVICE: 3/1/2022  Primary Care Physician:  Lisa Samuel APRN   Date of Discharge: 3/1/2022  Admission Date: 1/5/2022  FACILITY: [facility]    Important issues to note:  - Metoprolol discontinued due to persistent hypotension  - Family/patient have requested neurology referral, defer to PCP upon discharge from SNF  - Continue to recommend COVID vaccination    Discharge Diagnosis:    Encounter Diagnoses   Name Primary?   • Multiple sclerosis (HCC) Yes   • Tachycardia    • Physical debility    • History of COVID-19         Presenting Problem: Generalized weakness and debility.    History of Presenting Illness:  Mr. Tripathi is a 51-year of age male with history of multiple sclerosis with multiple falls and functional decline.  He presented to [facility] for acute kidney injury and pneumonia.  Patient was admitted to the hospitalist service where he was treated with IV cefepime and started on metoprolol for sinus tachycardia.  Given his debility he was transferred to this SNF for rehabilitation and strengthening.    Rehabilitation Course:  Patient continued in stable condition since admission to this SNF.  Unfortunately, patient often refused therapy and had very little improvement to his debility.  Nursing share that he is largely bedridden, and has not been ambulatory on a regular basis.  In addition, he had persistent low blood pressures for which metoprolol was often held and subsequently discontinued.   Per chart review, patient reported to refuse Covid vaccination.  Due to facility outbreak, routine point-of-care testing performed as per facility protocol.  He did have some complaints of occasional cough with wheezing.  He was noted to have Covid positive test on 2/192022.  Chest x-ray obtained notable for pulmonary vascular congestion.  He did complete a course of antibiotics, steroids, and albuterol inhaler.  He remained afebrile without increased O2 demands, completing course of isolation/droplet precautions as per facility protocol.  He did have poor appetite, denied any loss of taste or smell.  Patient and girlfriend requested discharge to home and have worked with  in regards to discharge planning.  Caregiver informed of patient's poor mobility, ultimately wishes to proceed with discharge to home.  Have recommended follow up with PCP within 2 weeks, sooner if needed.  In addition, have recommended patient continue with home health, skilled nursing care, PT/OT.  He denies any need for DME.  Patient also reports that he would not need home health services, I continue to recommend given his poor mobility, SSI to follow.      Recent Labs:  1/7: Sodium 136, potassium 4.7, BUN 12, creatinine 0.8, total cholesterol 185, triglycerides 57, HDL 35, WBC 12.3 hemoglobin 13.8, hematocrit 40.7, TSH 3.009, A1c 5.4  2/21: Sodium 139, potassium 4.5, BUN 11, creatinine 0.7, procalcitonin 0.02, WBCs 11.4, hemoglobin 13.3, hematocrit 40.0    REVIEW OF SYSTEMS:    Generalized weakness, debility, memory problems.   All other systems were reviewed and are negative.     PHYSICAL EXAMINATION:     VITAL SIGNS:  BP 97/58   Pulse 99   Temp 96.7 °F (35.9 °C)   Resp 14   Wt 60.1 kg (132 lb 9.6 oz)   SpO2 96%   BMI 17.02 kg/m²     Nursing notes and vital signs reviewed.   General Appearance:  Chronically ill appearing male lying in bed, NAD.    Head: Normocephalic and atraumatic, without obvious abnormality     Eyes:  PERRLA.  Conjunctivae and sclerae normal.  EOM are within normal limits.    Neck: Normal range of motion. Neck supple. No JVD present.   Cardiovascular: Normal rate, regular rhythm.  Pulses palpable equal bilaterally.    Pulmonary/Chest: Effort normal and breath sounds normal. No respiratory distress.   Abdominal: Soft. Bowel sounds are normal. No distention or tenderness.     Musculoskeletal: Normal range of motion. No edema.  Diffuse deconditioning.    Neurological: Alert and oriented at baseline.    Skin: Skin is warm and dry.   Psychiatric:  Normal mood and flat affect. Normal behavior       Condition on Discharge:    Stable to home    Discharge Medication:    Current Outpatient Medications:   •  acetaminophen (TYLENOL) 650 MG 8 hr tablet, Take 650 mg by mouth Every 6 (Six) Hours As Needed for Mild Pain ., Disp: , Rfl:   •  albuterol sulfate  (90 Base) MCG/ACT inhaler, Inhale 1 puff Every 4 (Four) Hours As Needed for Wheezing., Disp: , Rfl:      Time: Discharge 22 min    Magi Cohen PA-C   03/01/2022

## 2022-04-30 VITALS
TEMPERATURE: 96.5 F | HEART RATE: 52 BPM | WEIGHT: 132.6 LBS | DIASTOLIC BLOOD PRESSURE: 50 MMHG | RESPIRATION RATE: 22 BRPM | BODY MASS INDEX: 17.02 KG/M2 | OXYGEN SATURATION: 92 % | SYSTOLIC BLOOD PRESSURE: 90 MMHG

## 2022-07-18 NOTE — PROGRESS NOTES
d  Nursing Home Progress Note        Héctor Watson DO []  MIKAYLA Ahuja []  [address] [place] [address]  Phone: [phone]  Fax: [phone] Elba Barbour MD []  Juan Pablo Olivarez DO []  Magi Cohen PA-C [x]   [address] [place] [address]  Phone: [phone]  Fax: [phone]     PATIENT NAME: Man Williamson                                                                          YOB: 1970           DATE OF SERVICE: 2/25/2022  FACILITY: [facility]    CHIEF COMPLAINT:  Chronic medical management and long term care needs.        HISTORY OF PRESENT ILLNESS:   Mr. Williamson is a 51-year of age male with history of multiple sclerosis, recurrent falls, and functional decline.  He was recently hospitalized at [facility] for OSWALDO pneumonia.  He did have sinus tachycardia for which he was initiated on metoprolol.     Patient recently diagnosed with COVID 19, will continue under precautions until tomorrow.  He will complete Abx course today, continues with course of steroids.  No acute changes in respiratory status reported.  He continues with poor appetite, family usually bring outside food.  Discussed likes and dislikes today, for which patient requests PB sandwich.  He denies any acute complaints.      PAST MEDICAL & SURGICAL HISTORY:   Past Medical History:   Diagnosis Date   • Kidney stone    • Low back pain    • Multiple lesions on computed tomography of brain and spine    • Pneumonia       Past Surgical History:   Procedure Laterality Date   • TONSILLECTOMY           MEDICATIONS:  I have reviewed and reconciled the patients medication list in the patients chart at the skilled nursing facility today.      ALLERGIES:  Allergies   Allergen Reactions   • Acetaminophen Irritability         SOCIAL HISTORY:  Social History     Socioeconomic History   • Marital status:    Tobacco Use   • Smoking status: Former Smoker     Types: Cigarettes, Cigars      Quit date: 2016     Years since quittin.6   • Smokeless tobacco: Former User     Types: Chew   Substance and Sexual Activity   • Alcohol use: Yes     Comment: rare   • Drug use: Yes     Types: Marijuana, Benzodiazepines     Comment: abnormal urine drug screen   • Sexual activity: Yes     Partners: Female     Birth control/protection: Surgical       FAMILY HISTORY:  Family History   Problem Relation Age of Onset   • Hypertension Mother    • Other Mother    • Coronary artery disease Father        REVIEW OF SYSTEMS:    Fatigue/malaise, impaired mobility, generalized weakness.   All other systems were reviewed and are negative.     PHYSICAL EXAMINATION:     VITAL SIGNS:  /61  HR 86  Temp 96.7  Weight 132 lbs.  Res. 16  O2 96%  Nursing notes and vital signs reviewed.   General Appearance:  Frail appearinge male, lying in bed. NAD.   Head: Normocephalic and atraumatic, without obvious abnormality     Eyes: PERRLA.  Conjunctivae and sclerae normal.  EOM are within normal limits.    Neck: Normal range of motion. Neck supple. No JVD present.   Cardiovascular: Normal rate, regular rhythm.  Pulses palpable equal bilaterally.    Pulmonary/Chest: Effort normal, no respiratory distress. CTA bilaterally.    Abdominal: Soft. Bowel sounds are normal. No distention or tenderness.     Musculoskeletal: Normal range of motion. No edema.   Neurological: Alert and oriented at baseline.    Skin: Skin is warm and dry.   Psychiatric:  Normal mood.  Flat affect. Normal behavior.     RECORDS REVIEW:   :  K 4.5, Na 139, BUN 11, Cr. 0.7, WBC 11.4, Hgb 13.3, Hct 40.0  Admission labs: ; Glucose 74, sodium 136, potassium 4.7, A1c 5.4, WBCs 12.3, hemoglobin 13.8, procal 0.02 hematocrit 40.7, TSH 3.009    ASSESSMENT     Diagnoses and all orders for this visit:    1. COVID-19 (Primary)    2. Tachycardia    3. Primary hypertension    4. Physical debility    5. Multiple sclerosis (HCC)        PLAN  COVID 19:  Continue with  isolation precautions, slated to end tomorrow.  CXR reassuring, procal negative.  Will repeat CBC due to mild leukocytosis on Monday.  Discussed food preferences with nursing.  Monitor weight, hydration, nutrition as per protocol.  Continue with close monitoring, encouraging nursing to contact myself or MD with any acute changes.     primary hypertension/tachycardia: No recurrent tachycardia, hypotension/bradycardia improved with discontinuing metoprolol.      MS/physical debility: Continue PT/OT as indicated.  Fall precautions in place.  Continue with supportive care at SNF.    [x]  Discussed Patient in detail with nursing/staff, addressed all needs today.     [x]  Plan of Care Reviewed   []  PT/OT Reviewed   []  Order Changes  []  Discharge Plans Reviewed  [x]  Advance Directive on file with Nursing Home.   [x]  POA on file with Nursing Home.    [x]  Code Status: FULL          Magi Cohen PA-C.

## 2023-12-20 NOTE — DISCHARGE INSTRUCTIONS
30 day re assesment. Emphasis on quad strengthening and maintaining PWB left LE. Patient is advised to drink plenty of water.    Take GoLYTELY as prescribed.    Follow-up with primary care physician for repeat evaluation within the next week.   Render Risk Assessment In Note?: no Detail Level: Simple Additional Notes: Patient is getting treated by another provider with antibiotics. Patient will follow up with his hip surgeon for further evaluation and treatment.